# Patient Record
Sex: MALE | Race: WHITE | NOT HISPANIC OR LATINO | Employment: FULL TIME | URBAN - METROPOLITAN AREA
[De-identification: names, ages, dates, MRNs, and addresses within clinical notes are randomized per-mention and may not be internally consistent; named-entity substitution may affect disease eponyms.]

---

## 2022-04-04 ENCOUNTER — OFFICE VISIT (OUTPATIENT)
Dept: PHYSICAL THERAPY | Facility: CLINIC | Age: 37
End: 2022-04-04
Payer: COMMERCIAL

## 2022-04-04 DIAGNOSIS — M54.50 CHRONIC BILATERAL LOW BACK PAIN WITHOUT SCIATICA: Primary | ICD-10-CM

## 2022-04-04 DIAGNOSIS — G89.29 CHRONIC BILATERAL LOW BACK PAIN WITHOUT SCIATICA: Primary | ICD-10-CM

## 2022-04-04 DIAGNOSIS — M62.81 MUSCLE WEAKNESS (GENERALIZED): ICD-10-CM

## 2022-04-04 PROCEDURE — 97162 PT EVAL MOD COMPLEX 30 MIN: CPT | Performed by: PHYSICAL THERAPIST

## 2022-04-04 PROCEDURE — 97110 THERAPEUTIC EXERCISES: CPT | Performed by: PHYSICAL THERAPIST

## 2022-04-05 NOTE — PROGRESS NOTES
PT Evaluation     Today's date: 2022  Patient name: Ramandeep Kellogg  : 1985  MRN: [id number]  Referring provider: Gary Devries PT  Dx:   Encounter Diagnosis     ICD-10-CM    1  Chronic bilateral low back pain without sciatica  M54 50     G89 29    2  Muscle weakness (generalized)  M62 81                   Assessment  Assessment details: Ramandeep Kellogg is a 39 y o  male presenting to outpatient physical therapy at [address] [place] [address] with complaints of chronic progressive L sided LBP with insidious onset approximately three years ago   He presents with decreased range of motion, decreased strength, limited flexibility, poor postural awareness, poor body mechanics, poor balance, decreased tolerance to activity and decreased functional mobility due to Chronic bilateral low back pain without sciatica  (primary encounter diagnosis), Muscle weakness (generalized)  Therapist discussed diagnosis, prognosis, plan of care, proper responses to exercises, HEP, and modalities to use at home   Adenike Johnson would benefit from skilled PT services in order to address these deficits and reach maximum level of function   Thank you for the referral!  Impairments: abnormal coordination, abnormal gait, abnormal muscle firing, abnormal muscle tone, abnormal or restricted ROM, abnormal movement, activity intolerance, impaired physical strength, lacks appropriate home exercise program, pain with function, poor posture  and poor body mechanics    Symptom irritability: moderateUnderstanding of Dx/Px/POC: good   Prognosis: good    Goals  STGs (4 weeks):  1  Pt will report having at least a 50% improvement since I E    2  Pt will report having at most a 2/10 pain level with functional mobility  3  Pt will demonstrate good posture with prolonged positions as well as transitional movements especially from sitting to standing  LTGs (in 12 weeks):  1   Pt will report having at least a 75% improvement since I E    2  Pt will demonstrate good lifting mechanics for ADLs and activities  3  Pt will report having minimal AM pain when he gets OOB  4  Pt will be independent with HEP  5  Pt will deny having pain when he bends forward such as donning/doffing socks and shoes  Plan  Patient would benefit from: skilled physical therapy  Planned modality interventions: TENS and unattended electrical stimulation  Planned therapy interventions: joint mobilization, manual therapy, neuromuscular re-education, balance, patient education, self care, strengthening, stretching, therapeutic activities, therapeutic exercise, home exercise program, gait training and flexibility  Frequency: 2x week  Plan of Care beginning date: 2022  Plan of Care expiration date: 2022  Treatment plan discussed with: patient        Subjective Evaluation    History of Present Illness  Mechanism of injury: Pt is a 39year old male who presents with chronic progressive L sided LBP with insidious onset approximately three years ago  He reports that he has noted shifting to the right side when sitting and painful transitions however it used to work itself out  Recently he had an exacerbation with sxs with sxs not improving  No current imaging  Quality of life: good    Pain  Current pain ratin  At best pain ratin  At worst pain ratin  Pain location: L sided low back; L SI region  Quality: tight, sharp, dull ache, discomfort, pressure and pulling  Relieving factors: relaxation and rest  Aggravating factors: standing and walking (prolonged sitting with increased sxs with transition to standing; lifting; bending over; donning/doffing socks and shoes)  Progression: worsening    Patient Goals  Patient goals for therapy: decreased pain, increased motion, increased strength, independence with ADLs/IADLs, return to sport/leisure activities and return to work          Objective     Posture: Lumbar lordosis is increased in standing  There is right lateral shift      In siting, lumbar roll improves comfort      Lumbar AROM limitations:  (*=  Pain)  Lumbar flexion: 50%*  Lumbar extension: 75%  R side glide:  50%*  L side glide:  25%*    Mechanical Asessment: pre-test symtpoms include L sided low back pain near SI jt region  Repeated Extension in Standing (MARIANO): Slight improvement  Repeated Extension in Lying (REIL):  Slight improvement    Strength:  Core strength: Upper abs: 4-/5; lower abs: 3-/5     Right  Left  Hip flexion:  4/5  4/5  Knee ext  4/5  4/5  Ankle DF  4+/5  4+/5  Ankle PF  4/5  4/5  Knee flex  4-/5  4-/5      Hip abduction  4-/5  4-/5  Hip adduction  3+/5  3+/5  Hip extension  3+/5  3+/5    Joint mobility: decreased PA glides in lumbar spine    Tenderness/Palpation: no TPR    Sensation: intact light touch throughout BLEs     Flexibility: decreased HS; hip flexor; quadriceps, and piriformis (L worse than R)    Function: decreased trunk flexion with squatting movements with chair mobility        Precautions: standard    HEP: L lateral side gliding in standing, PERFECTO; PPU    Specialty Daily Treatment Diary       Manual 4/4       L sided positional distraction        L reverse [place] jt mob        PA glides lumbar spine        Tiger tail lumbar paraspinals                OP with PPU        Guarded L lateral side glide with pushing hips to the right                        Exercise Diary                 TERT with lumbar extension        Prone glute sets        Prone quad stretch c SOS                        PERFECTO-PPU 5 sec x 10        OP c PPU        Prone H' Ext                 Standing Doorway L lateral trunk gliding (R arm on doorway with hips dropping to the right) 10x       Standing Lumbar extension combo with L backward lean                        TAC        TAC c Marching on table        TAC c marching LEs off table        TAC c OH reach with BLE lifted off table                TAC c SLR                TAC c Dying Bug                TAC c LTR -> Lift BLEs off table Ball Squeezes        TB Supine Clamshells        TB Sidelying Clamshells        TB Bridges                        Seated or Supine HS stretch        Seated or Spine 2 way piriformis stretch                 Squats        Lunges                HEP/EDU Diagnosis, prognosis, plan of care; HEP; proper responses to exercises; modalities to use at home       Modalities        MH        CP        EStim           Skin checks performed pre and post application: intact

## 2022-04-07 ENCOUNTER — OFFICE VISIT (OUTPATIENT)
Dept: PHYSICAL THERAPY | Facility: CLINIC | Age: 37
End: 2022-04-07
Payer: COMMERCIAL

## 2022-04-07 DIAGNOSIS — M54.50 CHRONIC BILATERAL LOW BACK PAIN WITHOUT SCIATICA: Primary | ICD-10-CM

## 2022-04-07 DIAGNOSIS — M62.81 MUSCLE WEAKNESS (GENERALIZED): ICD-10-CM

## 2022-04-07 DIAGNOSIS — G89.29 CHRONIC BILATERAL LOW BACK PAIN WITHOUT SCIATICA: Primary | ICD-10-CM

## 2022-04-07 PROCEDURE — 97110 THERAPEUTIC EXERCISES: CPT | Performed by: PHYSICAL THERAPIST

## 2022-04-07 PROCEDURE — 97140 MANUAL THERAPY 1/> REGIONS: CPT | Performed by: PHYSICAL THERAPIST

## 2022-04-07 PROCEDURE — 97112 NEUROMUSCULAR REEDUCATION: CPT | Performed by: PHYSICAL THERAPIST

## 2022-04-07 NOTE — PROGRESS NOTES
Daily Note     Today's date: 2022  Patient name: Carine Infante  : 1985  MRN: [id number]  Referring provider: Joan Alvares, PT  Dx:   Encounter Diagnosis     ICD-10-CM    1  Chronic bilateral low back pain without sciatica  M54 50     G89 29    2  Muscle weakness (generalized)  M62 81                   Subjective: Pt reports that his wife has noticed improvements in his posture  He did notice having some lateral upper calf below the knee on the left side  Objective: See treatment diary below      Assessment: Tolerated treatment well; initiated POC with MH to low back in prone for prolonged lumbar extension f/b glute sets  VCs provided on proper sequencing with exercises; added ball squeezes, bilateral hip abduction in hooklying  He denies having increased pain throughout session  Discussed lifting techniques at home especially during yardwork  Discussed self TPR using tiger tail and rock ball  Patient demonstrated fatigue post treatment and would benefit from continued PT      Plan: Continue per plan of care            HEP: L lateral side gliding in standing, PERFECTO; PPU    Specialty Daily Treatment Diary       Manual       L sided positional distraction        L reverse [place] jt mob        PA glides lumbar spine  SMF      [place] tail lumbar paraspinals  SMF to L lateral calf              OP with PPU  2x10      Guarded L lateral side glide with pushing hips to the right  NV                      Exercise Diary                 TERT with lumbar extension  5 mins      Prone glute sets  5 sec x 30      Prone quad stretch c SOS  20 sec x 3 ea                      PERFECTO-PPU 5 sec x 10  5 sec x 10       OP c PPU  5 sec; 2x10      Prone H' Ext                 Standing Doorway L lateral trunk gliding (R arm on doorway with hips dropping to the right) 10x 2x10      Standing Lumbar extension combo with L backward lean                        TAC  10 sec x 10       TAC c Marching on table        TAC c marching LEs off table        TAC c OH reach with BLE lifted off table                TAC c SLR                TAC c Dying Bug                TAC c LTR -> Lift BLEs off table                Ball Squeezes  10 sec x 20      TB Hooklying Clamshells  BTB 10 sec x 20      TB Sidelying Clamshells        TB Bridges                        Seated or Supine HS stretch        Seated or Spine 2 way piriformis stretch                 Squats        Lunges                HEP/EDU Diagnosis, prognosis, plan of care; HEP; proper responses to exercises; modalities to use at home       Modalities        MH  10 mins in prone with lumbar extension      CP        EStim           Skin checks performed pre and post application: intact

## 2022-04-11 ENCOUNTER — OFFICE VISIT (OUTPATIENT)
Dept: PHYSICAL THERAPY | Facility: CLINIC | Age: 37
End: 2022-04-11
Payer: COMMERCIAL

## 2022-04-11 DIAGNOSIS — M62.81 MUSCLE WEAKNESS (GENERALIZED): ICD-10-CM

## 2022-04-11 DIAGNOSIS — G89.29 CHRONIC BILATERAL LOW BACK PAIN WITHOUT SCIATICA: Primary | ICD-10-CM

## 2022-04-11 DIAGNOSIS — M54.50 CHRONIC BILATERAL LOW BACK PAIN WITHOUT SCIATICA: Primary | ICD-10-CM

## 2022-04-11 PROCEDURE — 97112 NEUROMUSCULAR REEDUCATION: CPT | Performed by: PHYSICAL THERAPIST

## 2022-04-11 PROCEDURE — 97140 MANUAL THERAPY 1/> REGIONS: CPT | Performed by: PHYSICAL THERAPIST

## 2022-04-11 PROCEDURE — 97110 THERAPEUTIC EXERCISES: CPT | Performed by: PHYSICAL THERAPIST

## 2022-04-11 NOTE — PROGRESS NOTES
Daily Note     Today's date: 2022  Patient name: Brain Hughes  : 1985  MRN: [id number]  Referring provider: Oren Ching, PT  Dx:   Encounter Diagnosis     ICD-10-CM    1  Chronic bilateral low back pain without sciatica  M54 50     G89 29    2  Muscle weakness (generalized)  M62 81                   Subjective: Pt reports that he did feel better on Friday after the exercises however yesterday he did a lot of yard work and presents with soreness in his low back pain  Objective: See treatment diary below      Assessment: Tolerated treatment well; initiated POC with MH to low back with table elevated with glute sets f/b prone quad stretch  MT performed after receiving consent from pt; VCs provided on proper sequencing with exercises; added cross leg rotation stretch and advanced core strengthening  He had lateral lower leg pain during core advancement and thus held core exercise  Patient demonstrated fatigue post treatment and would benefit from continued PT       Plan: Continue per plan of care            HEP: L lateral side gliding in standing, PERFECTO; PPU    Specialty Daily Treatment Diary       Manual      L sided positional distraction        L reverse [place] jt mob        PA glides lumbar spine  SMF SMF     [place] tail lumbar paraspinals  SMF to L lateral calf              TPR B lumbar paraspinals    SMF             OP with PPU  2x10 2x10     Guarded L lateral side glide with pushing hips to the right  NV 10x                     Exercise Diary                 TERT with lumbar extension  5 mins 5 mins     Prone glute sets  5 sec x 30 5 sec x 30     Prone quad stretch c SOS  20 sec x 3 ea 20 sec x 5 ea                     PERFECTO-PPU 5 sec x 10  5 sec x 10  5 sec x 10     OP c PPU  5 sec; 2x10 5 sec; 2x10     Prone H' Ext                 Standing Doorway L lateral trunk gliding (R arm on doorway with hips dropping to the right) 10x 2x10 2x10     Standing Lumbar extension combo with L backward lean                        TAC  10 sec x 10       TAC c Marching on table   3 (p!  In lateral calf)     TAC c marching LEs off table        TAC c OH reach with BLE lifted off table                TAC c SLR                TAC c Dying Bug                TAC c LTR -> Lift BLEs off table                        Cross Leg Rotation stretch   10 sec x 5 ea     Ball Squeezes  10 sec x 20 10 sec x 20      TB Hooklying Clamshells  BTB 10 sec x 20 BTB 10 sec x 20     TB Sidelying Clamshells        TB Bridges                Supine Sciatic N Glide   NV             Seated or Supine HS stretch        Seated or Spine 2 way piriformis stretch                 Squats        Lunges                HEP/EDU Diagnosis, prognosis, plan of care; HEP; proper responses to exercises; modalities to use at home       Modalities        MH  10 mins in prone with lumbar extension 10 mins in prone with lumbar extension     CP        EStim           Skin checks performed pre and post application: intact

## 2022-04-14 ENCOUNTER — OFFICE VISIT (OUTPATIENT)
Dept: PHYSICAL THERAPY | Facility: CLINIC | Age: 37
End: 2022-04-14
Payer: COMMERCIAL

## 2022-04-14 DIAGNOSIS — M54.50 CHRONIC BILATERAL LOW BACK PAIN WITHOUT SCIATICA: Primary | ICD-10-CM

## 2022-04-14 DIAGNOSIS — M62.81 MUSCLE WEAKNESS (GENERALIZED): ICD-10-CM

## 2022-04-14 DIAGNOSIS — G89.29 CHRONIC BILATERAL LOW BACK PAIN WITHOUT SCIATICA: Primary | ICD-10-CM

## 2022-04-14 PROCEDURE — 97112 NEUROMUSCULAR REEDUCATION: CPT | Performed by: PHYSICAL THERAPIST

## 2022-04-14 PROCEDURE — 97140 MANUAL THERAPY 1/> REGIONS: CPT | Performed by: PHYSICAL THERAPIST

## 2022-04-14 PROCEDURE — 97110 THERAPEUTIC EXERCISES: CPT | Performed by: PHYSICAL THERAPIST

## 2022-04-14 NOTE — PROGRESS NOTES
Daily Note     Today's date: 2022  Patient name: Scott Benton  : 1985  MRN: [id number]  Referring provider: Kristyn Daniel PT  Dx:   Encounter Diagnosis     ICD-10-CM    1  Chronic bilateral low back pain without sciatica  M54 50     G89 29    2  Muscle weakness (generalized)  M62 81                   Subjective: Pt reports that with prolonged sitting he has increased pain in his low back as well as difficulty with transfers  Objective: See treatment diary below      Assessment: Tolerated treatment well; initiated POC with MH to low back in lumbar extension with glute sets  VCs provided on proper sequencing with exercises;  sciatic nerve glide  MT performed after receiving consent from pt; PA glides performed along lumbar spine and OP with PPU  Added lumbar extension to lateral glide  He denies having pain but some discomfort with SI jt region  He was instructed to perform over the weekend  Patient demonstrated fatigue post treatment and would benefit from continued PT      Plan: Continue per plan of care            HEP: L lateral side gliding in standing, PERFECTO; PPU    Specialty Daily Treatment Diary       Manual     L sided positional distraction        L reverse [place] jt mob        PA glides lumbar spine  SMF SMF SMF    [place] tail lumbar paraspinals  SMF to L lateral calf              TPR B lumbar paraspinals    SMF SMF            OP with PPU  2x10 2x10 *see below for OP    Guarded L lateral side glide with pushing hips to the right  NV 10x                     Exercise Diary                 TERT with lumbar extension  5 mins 5 mins 5 mins    Prone glute sets  5 sec x 30 5 sec x 30 5 sec x 30     Prone quad stretch c SOS  20 sec x 3 ea 20 sec x 5 ea 30 sec x 3 ea                    PERFECTO-PPU 5 sec x 10  5 sec x 10  5 sec x 10 5 sec x 10    OP c PPU  5 sec; 2x10 5 sec; 2x10 5 sec; 3x10     Prone H' Ext                 Standing Doorway L lateral trunk gliding (R arm on doorway with hips dropping to the right) 10x 2x10 2x10 10x    Standing Lumbar extension combo with L backward lean    10x                    TAC  10 sec x 10       TAC c Marching on table   3 (p!  In lateral calf)     TAC c marching LEs off table        TAC c OH reach with BLE lifted off table                TAC c SLR                TAC c Dying Bug                TAC c LTR -> Lift BLEs off table                        Cross Leg Rotation stretch   10 sec x 5 ea 15 sec x 3 ea    Ball Squeezes  10 sec x 20 10 sec x 20  10 sec x 20     TB Hooklying Clamshells  BTB 10 sec x 20 BTB 10 sec x 20 BTB 10 sec x 20    TB Sidelying Clamshells        TB Bridges                Supine Sciatic N Glide   NV 2 sec x 10 (LLE only)            Seated or Supine HS stretch    NV    Seated or Spine 2 way piriformis stretch     NV            Squats        Lunges                HEP/EDU Diagnosis, prognosis, plan of care; HEP; proper responses to exercises; modalities to use at home       Modalities        MH  10 mins in prone with lumbar extension 10 mins in prone with lumbar extension 10 mins in prone with lumbar extension    CP        EStim           Skin checks performed pre and post application: intact

## 2022-04-18 ENCOUNTER — OFFICE VISIT (OUTPATIENT)
Dept: PHYSICAL THERAPY | Facility: CLINIC | Age: 37
End: 2022-04-18
Payer: COMMERCIAL

## 2022-04-18 DIAGNOSIS — M62.81 MUSCLE WEAKNESS (GENERALIZED): ICD-10-CM

## 2022-04-18 DIAGNOSIS — M54.50 CHRONIC BILATERAL LOW BACK PAIN WITHOUT SCIATICA: Primary | ICD-10-CM

## 2022-04-18 DIAGNOSIS — G89.29 CHRONIC BILATERAL LOW BACK PAIN WITHOUT SCIATICA: Primary | ICD-10-CM

## 2022-04-18 PROCEDURE — 97110 THERAPEUTIC EXERCISES: CPT | Performed by: PHYSICAL THERAPIST

## 2022-04-18 PROCEDURE — 97112 NEUROMUSCULAR REEDUCATION: CPT | Performed by: PHYSICAL THERAPIST

## 2022-04-18 PROCEDURE — 97140 MANUAL THERAPY 1/> REGIONS: CPT | Performed by: PHYSICAL THERAPIST

## 2022-04-18 NOTE — PROGRESS NOTES
Daily Note     Today's date: 2022  Patient name: Aisha Peralta  : 1985  MRN: [id number]  Referring provider: Velvet Dailey, PT  Dx:   Encounter Diagnosis     ICD-10-CM    1  Chronic bilateral low back pain without sciatica  M54 50     G89 29    2  Muscle weakness (generalized)  M62 81                   Subjective: Patient reports that he was pretty stiff after last time  He notices more difficulty with sitting then having to perform standing transfers  Objective: See treatment diary below      Assessment: Tolerated treatment well; initiated POC with MH to low back with lumbar extension while performing there ex  VCs provided on proper sequencing with exercises; he reports having increased in L lateral calf pain with referred back in to the buttocks as well  Added DKTC, supine HS stretch  Discussed stretching prior to transitions with seated forward trunk flexion stretch  Patient demonstrated fatigue post treatment and would benefit from continued PT      Plan: Continue per plan of care        HEP: L lateral side gliding in standing, PERFECTO; PPU    Specialty Daily Treatment Diary       Manual    L sided positional distraction        L reverse [place] jt mob        PA glides lumbar spine  SMF SMF SMF SMF   [place] tail lumbar paraspinals  SMF to L lateral calf      Tiger tail L calf and hamstring     SMF   TPR B lumbar paraspinals    SMF SMF            OP with PPU  2x10 2x10 *see below for OP * see below for OP   Guarded L lateral side glide with pushing hips to the right  NV 10x                     Exercise Diary                 TERT with lumbar extension  5 mins 5 mins 5 mins 5 mins   Prone glute sets  5 sec x 30 5 sec x 30 5 sec x 30  5 sec x 30   Prone quad stretch c SOS  20 sec x 3 ea 20 sec x 5 ea 30 sec x 3 ea 30 sec x 3 ea                   PERFECTO-PPU 5 sec x 10  5 sec x 10  5 sec x 10 5 sec x 10 5 sec x 10   OP c PPU  5 sec; 2x10 5 sec; 2x10 5 sec; 3x10  5 sec; 2x10 Prone H' Ext                 Standing Doorway L lateral trunk gliding (R arm on doorway with hips dropping to the right) 10x 2x10 2x10 10x    Standing Lumbar extension combo with L backward lean    10x 10x                   TAC  10 sec x 10       TAC c Marching on table   3 (p!  In lateral calf)     TAC c marching LEs off table        TAC c OH reach with BLE lifted off table                TAC c SLR                TAC c Dying Bug                TAC c LTR -> Lift BLEs off table                        Cross Leg Rotation stretch   10 sec x 5 ea 15 sec x 3 ea 20 sec x 3 ea   Ball Squeezes  10 sec x 20 10 sec x 20  10 sec x 20  10 sec x 20   TB Hooklying Clamshells  BTB 10 sec x 20 BTB 10 sec x 20 BTB 10 sec x 20 BTB 10 sec x 20   TB Sidelying Clamshells        TB Bridges                DKTC     5 sec x 10           Supine Sciatic N Glide   NV 2 sec x 10 (LLE only) 2sec x 10 (LLE only)           Seated or Supine HS stretch    NV Supine c SOS: 20 sec x 3   Seated or Spine 2 way piriformis stretch     NV NV   Seated Forward Trunk Flexion in      5 sec x 5 ea forward and to the right           Squats        Lunges                HEP/EDU Diagnosis, prognosis, plan of care; HEP; proper responses to exercises; modalities to use at home       Modalities        MH  10 mins in prone with lumbar extension 10 mins in prone with lumbar extension 10 mins in prone with lumbar extension 10 mins in prone with lumbar extension   CP        EStim           Skin checks performed pre and post application: intact

## 2022-04-21 ENCOUNTER — OFFICE VISIT (OUTPATIENT)
Dept: PHYSICAL THERAPY | Facility: CLINIC | Age: 37
End: 2022-04-21
Payer: COMMERCIAL

## 2022-04-21 DIAGNOSIS — M54.50 CHRONIC BILATERAL LOW BACK PAIN WITHOUT SCIATICA: Primary | ICD-10-CM

## 2022-04-21 DIAGNOSIS — M62.81 MUSCLE WEAKNESS (GENERALIZED): ICD-10-CM

## 2022-04-21 DIAGNOSIS — G89.29 CHRONIC BILATERAL LOW BACK PAIN WITHOUT SCIATICA: Primary | ICD-10-CM

## 2022-04-21 PROCEDURE — 97112 NEUROMUSCULAR REEDUCATION: CPT | Performed by: PHYSICAL THERAPIST

## 2022-04-21 PROCEDURE — 97110 THERAPEUTIC EXERCISES: CPT | Performed by: PHYSICAL THERAPIST

## 2022-04-21 PROCEDURE — 97140 MANUAL THERAPY 1/> REGIONS: CPT | Performed by: PHYSICAL THERAPIST

## 2022-04-21 NOTE — PROGRESS NOTES
Daily Note     Today's date: 2022  Patient name: Vanessa Higgins  : 1985  MRN: [id number]  Referring provider: Cecilia Waldrop, PT  Dx:   Encounter Diagnosis     ICD-10-CM    1  Chronic bilateral low back pain without sciatica  M54 50     G89 29    2  Muscle weakness (generalized)  M62 81                   Subjective: Pt reports that while he was at work he had more back pain than he did while he was driving  Objective: See treatment diary below      Assessment: Tolerated treatment well; initiated POC with MH to low back while performing prone exercises in prolonged extension  VCs provided on proper sequencing with exercises; added forward trunk flexion stretch 3 ways  He has increased stiffness with 3 way ball roll outs thus performed lateral flexion to extension post   Patient demonstrated fatigue post treatment and would benefit from continued PT      Plan: Continue per plan of care        HEP: L lateral side gliding in standing, PERFECTO; PPU    Specialty Daily Treatment Diary       Manual    L sided positional distraction        L reverse [place] jt mob        PA glides lumbar spine SMF    SMF   [place] tail lumbar paraspinals        Tiger tail L calf and hamstring SMF    SMF   L LAD in prone SMF       TPR B lumbar paraspinals                 OP with PPU * see below for OP    * see below for OP   Guarded L lateral side glide with pushing hips to the right                        Exercise Diary                 TERT with lumbar extension 5 mins     5 mins   Prone glute sets 5 sec x 30    5 sec x 30   Prone quad stretch c SOS 30 sec x 3 ea    30 sec x 3 ea                   PERFECTO-PPU 5 sec x 10    5 sec x 10   OP c PPU 5 sec; 2x10    5 sec; 2x10   Prone H' Ext                 Standing Doorway L lateral trunk gliding (R arm on doorway with hips dropping to the right)        Standing Lumbar extension combo with L backward lean 10x    10x                   TAC        TAC c Marching on table TAC c marching LEs off table        TAC c OH reach with BLE lifted off table                TAC c SLR                TAC c Dying Bug                TAC c LTR -> Lift BLEs off table                        Cross Leg Rotation stretch 20 sec x 3 ea    20 sec x 3 ea   Ball Squeezes 10 sec x 30    10 sec x 20   TB Hooklying Clamshells BTB 10 sec x 30    BTB 10 sec x 20   TB Sidelying Clamshells        TB Bridges                DKTC 5 sec x 10     5 sec x 10           Supine Sciatic N Glide 2 sec x 10 ea    2sec x 10 (LLE only)           Seated or Supine HS stretch Supine c SOS: 20 sec x 3     Supine c SOS: 20 sec x 3   Seated or Supine 2 way piriformis stretch  NV    NV   Seated Forward Trunk Flexion using green PB 3 way 5 sec x 5 ea Only perform forward:    5 sec x 5 ea forward and to the right           Squats        Lunges                HEP/EDU        Modalities        MH 8 mins in prone with lumbar extension    10 mins in prone with lumbar extension   CP        EStim           Skin checks performed pre and post application: intact

## 2022-04-25 ENCOUNTER — OFFICE VISIT (OUTPATIENT)
Dept: PHYSICAL THERAPY | Facility: CLINIC | Age: 37
End: 2022-04-25
Payer: COMMERCIAL

## 2022-04-25 DIAGNOSIS — M62.81 MUSCLE WEAKNESS (GENERALIZED): ICD-10-CM

## 2022-04-25 DIAGNOSIS — M54.50 CHRONIC BILATERAL LOW BACK PAIN WITHOUT SCIATICA: Primary | ICD-10-CM

## 2022-04-25 DIAGNOSIS — G89.29 CHRONIC BILATERAL LOW BACK PAIN WITHOUT SCIATICA: Primary | ICD-10-CM

## 2022-04-25 PROCEDURE — 97140 MANUAL THERAPY 1/> REGIONS: CPT | Performed by: PHYSICAL THERAPIST

## 2022-04-25 PROCEDURE — 97110 THERAPEUTIC EXERCISES: CPT | Performed by: PHYSICAL THERAPIST

## 2022-04-25 PROCEDURE — 97112 NEUROMUSCULAR REEDUCATION: CPT | Performed by: PHYSICAL THERAPIST

## 2022-04-25 NOTE — PROGRESS NOTES
Daily Note     Today's date: 2022  Patient name: Melissa Carbajal  : 1985  MRN: [id number]  Referring provider: Shonda Christopher, PT  Dx:   Encounter Diagnosis     ICD-10-CM    1  Chronic bilateral low back pain without sciatica  M54 50     G89 29    2  Muscle weakness (generalized)  M62 81                   Subjective: Pt reports that when he was sweeping he had discomfort in that lateral aspect of his lower leg  Objective: See treatment diary below      Assessment: Tolerated treatment well; initiated POC with MH in lumbar extension with glute sets f/b prone quad stretch  VCs provided on proper sequencing with exercises; added supine TFL stretch in supine with stretch out strap  MT performed after receiving consent from pt; tiger tail performing lateral left lower leg which patient reports having tenderness  He reports having some discomfort post lumbar extension to lean  Patient demonstrated fatigue post treatment and would benefit from continued PT      Plan: Continue per plan of care        HEP: L lateral side gliding in standing, PERFECTO; PPU    Specialty Daily Treatment Diary       Manual    L sided positional distraction        L reverse [place] jt mob        PA glides lumbar spine SMF SMF   SMF   [place] tail lumbar paraspinals        Tiger tail L calf and hamstring SMF SMF   SMF   L LAD in prone SMF SMF      TPR B lumbar paraspinals                 OP with PPU * see below for OP    * see below for OP   Guarded L lateral side glide with pushing hips to the right                        Exercise Diary                 TERT with lumbar extension 5 mins  5 mins   5 mins   Prone glute sets 5 sec x 30 5 sec x 30   5 sec x 30   Prone quad stretch c SOS 30 sec x 3 ea 30 sec x 3 ea   30 sec x 3 ea                   PERFECTO-PPU 5 sec x 10 5 sec x 10    5 sec x 10   OP c PPU 5 sec; 2x10 5 sec; 2x10   5 sec; 2x10   Prone H' Ext                 Standing Doorway L lateral trunk gliding (R arm on doorway with hips dropping to the right)        Standing Lumbar extension combo with L backward lean 10x 10x   10x                   TAC        TAC c Marching on table        TAC c marching LEs off table        TAC c OH reach with BLE lifted off table                TAC c SLR                TAC c Dying Bug                TAC c LTR -> Lift BLEs off table                        Cross Leg Rotation stretch 20 sec x 3 ea 20 sec x 3 ea   20 sec x 3 ea   Ball Squeezes 10 sec x 30 10 sec x 30   10 sec x 20   TB Hooklying Clamshells BTB 10 sec x 30 BTB 10 sec x 30   BTB 10 sec x 20   TB Sidelying Clamshells        TB Bridges                DKTC 5 sec x 10  10 sec x 5    5 sec x 10           Supine Sciatic N Glide 2 sec x 10 ea 2 sec x 10 ea   2sec x 10 (LLE only)   Supine TFL stretch   10 sec x 3 ea              Seated or Supine HS stretch Supine c SOS: 20 sec x 3  Supine c SOS: 20 sec x 3 ea   Supine c SOS: 20 sec x 3   Seated or Supine 2 way piriformis stretch  NV    NV   Seated Forward Trunk Flexion using green PB 3 way 5 sec x 5 ea Only perform forward: 5 sec x 5    5 sec x 5 ea forward and to the right           Squats        Lunges                HEP/EDU        Modalities        MH 8 mins in prone with lumbar extension 8 mins in prone with lumbar extension   10 mins in prone with lumbar extension   CP        EStim           Skin checks performed pre and post application: intact

## 2022-04-28 ENCOUNTER — APPOINTMENT (OUTPATIENT)
Dept: PHYSICAL THERAPY | Facility: CLINIC | Age: 37
End: 2022-04-28
Payer: COMMERCIAL

## 2022-04-29 ENCOUNTER — OFFICE VISIT (OUTPATIENT)
Dept: PHYSICAL THERAPY | Facility: CLINIC | Age: 37
End: 2022-04-29
Payer: COMMERCIAL

## 2022-04-29 DIAGNOSIS — M62.81 MUSCLE WEAKNESS (GENERALIZED): ICD-10-CM

## 2022-04-29 DIAGNOSIS — M54.50 CHRONIC BILATERAL LOW BACK PAIN WITHOUT SCIATICA: Primary | ICD-10-CM

## 2022-04-29 DIAGNOSIS — G89.29 CHRONIC BILATERAL LOW BACK PAIN WITHOUT SCIATICA: Primary | ICD-10-CM

## 2022-04-29 PROCEDURE — 97140 MANUAL THERAPY 1/> REGIONS: CPT | Performed by: PHYSICAL THERAPIST

## 2022-04-29 PROCEDURE — 97112 NEUROMUSCULAR REEDUCATION: CPT | Performed by: PHYSICAL THERAPIST

## 2022-04-29 PROCEDURE — 97110 THERAPEUTIC EXERCISES: CPT | Performed by: PHYSICAL THERAPIST

## 2022-04-29 NOTE — PROGRESS NOTES
Daily Note     Today's date: 2022  Patient name: Nilda Ramirez  : 1985  MRN: [id number]  Referring provider: Svetlana Iraheta, PT  Dx:   Encounter Diagnosis     ICD-10-CM    1  Chronic bilateral low back pain without sciatica  M54 50     G89 29    2  Muscle weakness (generalized)  M62 81                   Subjective: Pt reports that he continues to have discomfort when he performs sit to stand transfers after prolonged sitting  Objective: See treatment diary below      Assessment: Tolerated treatment well; initiated POC with MH to low back pain  VCs provided on proper sequencing with exercises; added prone hip extension and bridges  He reports feeling okay post session however had some discomfort with his L SI region  Patient demonstrated fatigue post treatment and would benefit from continued PT      Plan: Continue per plan of care        HEP: L lateral side gliding in standing, PERFECTO; PPU    Specialty Daily Treatment Diary       Manual    L sided positional distraction        L reverse [place] jt mob        PA glides lumbar spine SMF SMF SMF  SMF   [place] tail lumbar paraspinals        Tiger tail L calf and hamstring SMF SMF SMF  SMF   L LAD in prone SMF SMF SMF ea     TPR B lumbar paraspinals                 OP with PPU * see below for OP    * see below for OP   Guarded L lateral side glide with pushing hips to the right                        Exercise Diary                 TERT with lumbar extension 5 mins  5 mins 5 mins  5 mins   Prone glute sets 5 sec x 30 5 sec x 30 5 sec x 30  5 sec x 30   Prone quad stretch c SOS 30 sec x 3 ea 30 sec x 3 ea 30 sec x 3 ea  30 sec x 3 ea                   PERFECTO-PPU 5 sec x 10 5 sec x 10  5 sec x 10   5 sec x 10   OP c PPU 5 sec; 2x10 5 sec; 2x10 5 sec; 2x10  5 sec; 2x10   Prone H' Ext    0# 10 ea             Standing Doorway L lateral trunk gliding (R arm on doorway with hips dropping to the right)        Standing Lumbar extension combo with L backward lean 10x 10x 10x  10x                   TAC        TAC c Marching on table        TAC c marching LEs off table        TAC c OH reach with BLE lifted off table                TAC c SLR                TAC c Dying Bug                TAC c LTR -> Lift BLEs off table                        Cross Leg Rotation stretch 20 sec x 3 ea 20 sec x 3 ea 30 sec x 3 ea  20 sec x 3 ea   Ball Squeezes 10 sec x 30 10 sec x 30 10 sec x 20  10 sec x 20   Ball c bridge   10x     TB Hooklying Clamshells BTB 10 sec x 30 BTB 10 sec x 30 MTB 10 sec x 20  BTB 10 sec x 20   TB Sidelying Clamshells        TB Bridges                DKTC 5 sec x 10  10 sec x 5  10 sec x 3  5 sec x 10           Supine Sciatic N Glide 2 sec x 10 ea 2 sec x 10 ea 2 sec x 10 ea  2sec x 10 (LLE only)   Supine TFL stretch   10 sec x 3 ea NV             Seated or Supine HS stretch Supine c SOS: 20 sec x 3  Supine c SOS: 20 sec x 3 ea Supine c SOS: 30 sec x 3 ea  Supine c SOS: 20 sec x 3   Seated or Supine 2 way piriformis stretch  NV    NV   Seated Forward Trunk Flexion using green PB 3 way 5 sec x 5 ea Only perform forward: 5 sec x 5    5 sec x 5 ea forward and to the right           Squats        Lunges                HEP/EDU        Modalities        MH 8 mins in prone with lumbar extension 8 mins in prone with lumbar extension 8 mins in prone with lumbar extension  10 mins in prone with lumbar extension   CP        EStim           Skin checks performed pre and post application: intact

## 2022-05-02 ENCOUNTER — OFFICE VISIT (OUTPATIENT)
Dept: PHYSICAL THERAPY | Facility: CLINIC | Age: 37
End: 2022-05-02
Payer: COMMERCIAL

## 2022-05-02 DIAGNOSIS — G89.29 CHRONIC BILATERAL LOW BACK PAIN WITHOUT SCIATICA: Primary | ICD-10-CM

## 2022-05-02 DIAGNOSIS — M54.50 CHRONIC BILATERAL LOW BACK PAIN WITHOUT SCIATICA: Primary | ICD-10-CM

## 2022-05-02 DIAGNOSIS — M62.81 MUSCLE WEAKNESS (GENERALIZED): ICD-10-CM

## 2022-05-02 PROCEDURE — 97112 NEUROMUSCULAR REEDUCATION: CPT | Performed by: PHYSICAL THERAPIST

## 2022-05-02 PROCEDURE — 97110 THERAPEUTIC EXERCISES: CPT | Performed by: PHYSICAL THERAPIST

## 2022-05-02 PROCEDURE — 97140 MANUAL THERAPY 1/> REGIONS: CPT | Performed by: PHYSICAL THERAPIST

## 2022-05-02 NOTE — PROGRESS NOTES
Daily Note     Today's date: 2022  Patient name: Nilda Ramirez  : 1985  MRN: [id number]  Referring provider: Svetlana Iraheta, PT  Dx:   Encounter Diagnosis     ICD-10-CM    1  Chronic bilateral low back pain without sciatica  M54 50     G89 29    2  Muscle weakness (generalized)  M62 81                   Subjective: Patient reports that he was sore after last visit but not as bad as prior visits  Objective: See treatment diary below      Assessment: Tolerated treatment well; initiated POC with MH to low back while performing glute sets with lumbar extension  VCs provided on proper sequencing with exercises; added piriformis stretch  Patient demonstrated fatigue post treatment and would benefit from continued PT      Plan: Continue per plan of care        HEP: L lateral side gliding in standing, PERFECTO; PPU    Specialty Daily Treatment Diary       Manual  5    L sided positional distraction        L reverse [place] jt mob        PA glides lumbar spine SMF SMF SMF SMF    [place] tail lumbar paraspinals        Tiger tail L calf and hamstring SMF SMF SMF SMF    L LAD in prone SMF SMF SMF ea SMF (L only with Grade IV-V jt mobs)    TPR B lumbar paraspinals                 OP with PPU * see below for OP       Guarded L lateral side glide with pushing hips to the right                        Exercise Diary                 TERT with lumbar extension 5 mins  5 mins 5 mins 5 mins    Prone glute sets 5 sec x 30 5 sec x 30 5 sec x 30 5 sec x 30    Prone quad stretch c SOS 30 sec x 3 ea 30 sec x 3 ea 30 sec x 3 ea 30 sec x 3 ea                    PERFECTO-PPU 5 sec x 10 5 sec x 10  5 sec x 10  5 sec x 10     OP c PPU 5 sec; 2x10 5 sec; 2x10 5 sec; 2x10 5 sec; 2x10    Prone H' Ext    0# 10 ea 0# 10 ea            Standing Doorway L lateral trunk gliding (R arm on doorway with hips dropping to the right)        Standing Lumbar extension combo with L backward lean 10x 10x 10x 10x                    TAC TAC c Marching on table        TAC c marching LEs off table        TAC c OH reach with BLE lifted off table                TAC c SLR                TAC c Dying Bug                TAC c LTR -> Lift BLEs off table                        Cross Leg Rotation stretch 20 sec x 3 ea 20 sec x 3 ea 30 sec x 3 ea     Ball Squeezes 10 sec x 30 10 sec x 30 10 sec x 20 10 sec x 20     Ball c bridge   10x     TB Hooklying Clamshells BTB 10 sec x 30 BTB 10 sec x 30 MTB 10 sec x 20 MTB 10 sec x 20    TB Sidelying Clamshells        TB Bridges                DKTC 5 sec x 10  10 sec x 5  10 sec x 3             Supine Sciatic N Glide 2 sec x 10 ea 2 sec x 10 ea 2 sec x 10 ea 2 sec x 10 ea    Supine TFL stretch   10 sec x 3 ea NV 10 sec x 3 ea            Seated or Supine HS stretch Supine c SOS: 20 sec x 3  Supine c SOS: 20 sec x 3 ea Supine c SOS: 30 sec x 3 ea Supine c SOS: 30 sec x 3 ea    Seated or Supine 2 way piriformis stretch  NV   Seated fig 4 stretch: 20 sec x 3 ea side    Seated Forward Trunk Flexion using green PB 3 way 5 sec x 5 ea Only perform forward: 5 sec x 5               Squats        Lunges                HEP/EDU        Modalities        MH 8 mins in prone with lumbar extension 8 mins in prone with lumbar extension 8 mins in prone with lumbar extension 8 mins in prone with lumbar extension     CP        EStim           Skin checks performed pre and post application: intact

## 2022-05-05 ENCOUNTER — APPOINTMENT (OUTPATIENT)
Dept: PHYSICAL THERAPY | Facility: CLINIC | Age: 37
End: 2022-05-05
Payer: COMMERCIAL

## 2022-05-11 ENCOUNTER — OFFICE VISIT (OUTPATIENT)
Dept: FAMILY MEDICINE CLINIC | Facility: CLINIC | Age: 37
End: 2022-05-11
Payer: COMMERCIAL

## 2022-05-11 VITALS
SYSTOLIC BLOOD PRESSURE: 140 MMHG | TEMPERATURE: 98 F | BODY MASS INDEX: 29.92 KG/M2 | HEIGHT: 70 IN | DIASTOLIC BLOOD PRESSURE: 90 MMHG | OXYGEN SATURATION: 98 % | HEART RATE: 84 BPM | WEIGHT: 209 LBS

## 2022-05-11 DIAGNOSIS — D22.9 MULTIPLE ATYPICAL SKIN MOLES: ICD-10-CM

## 2022-05-11 DIAGNOSIS — G89.29 CHRONIC LEFT-SIDED LOW BACK PAIN WITH LEFT-SIDED SCIATICA: Primary | ICD-10-CM

## 2022-05-11 DIAGNOSIS — R03.0 ELEVATED BLOOD PRESSURE READING: ICD-10-CM

## 2022-05-11 DIAGNOSIS — E66.3 OVERWEIGHT (BMI 25.0-29.9): ICD-10-CM

## 2022-05-11 DIAGNOSIS — M54.42 CHRONIC LEFT-SIDED LOW BACK PAIN WITH LEFT-SIDED SCIATICA: Primary | ICD-10-CM

## 2022-05-11 DIAGNOSIS — M25.552 LEFT HIP PAIN: ICD-10-CM

## 2022-05-11 PROCEDURE — 99203 OFFICE O/P NEW LOW 30 MIN: CPT | Performed by: PHYSICIAN ASSISTANT

## 2022-05-11 PROCEDURE — 1036F TOBACCO NON-USER: CPT | Performed by: PHYSICIAN ASSISTANT

## 2022-05-11 PROCEDURE — 3725F SCREEN DEPRESSION PERFORMED: CPT | Performed by: PHYSICIAN ASSISTANT

## 2022-05-11 NOTE — PROGRESS NOTES
Assessment/Plan:       Problem List Items Addressed This Visit    None     Visit Diagnoses     Chronic left-sided low back pain with left-sided sciatica    -  Primary    Relevant Orders    XR hip/pelv 2-3 vws left if performed    XR spine lumbar minimum 4 views non injury    CBC and differential    Comprehensive metabolic panel    Lipid panel    TSH, 3rd generation with Free T4 reflex    HIV 1/2 w/ Reflex (Multispot)    Hepatitis C antibody    Overweight (BMI 25 0-29  9)        Relevant Orders    CBC and differential    Comprehensive metabolic panel    Lipid panel    TSH, 3rd generation with Free T4 reflex    HIV 1/2 w/ Reflex (Multispot)    Hepatitis C antibody    Multiple atypical skin moles        Relevant Orders    Ambulatory Referral to Dermatology            Elevated blood pressure-  Monitor blood pressure, and return   To office in 4 weeks for follow up   Sent to get blood test and lumbar/hip xray    Subjective:      Patient ID: Jennifer Cardozo is a 39 y o  male  C/o low back pain past few years  No hx  Of injuries; no heavy lifting  Pain worse when sitting down  Getting physical therapy, with Ignacia Hamilton  Last blood test and physical 2009 with [facility]  Review of Systems   Constitutional: Negative for chills, diaphoresis, fatigue and fever  Eyes: Negative for pain, discharge and visual disturbance  Wears glasses, goes for routine eye exams  Respiratory: Negative for cough, chest tightness and shortness of breath  Gastrointestinal: Negative for abdominal pain, constipation, diarrhea, nausea and vomiting  Endocrine: Negative for polydipsia, polyphagia and polyuria  Genitourinary: Negative for dysuria, frequency, hematuria, penile discharge, penile pain, penile swelling, scrotal swelling and testicular pain  Musculoskeletal: Positive for back pain and myalgias  Negative for neck pain and neck stiffness  Lower left side of back, whenever seated     Radiates down left leg      Neurological: Negative for dizziness, tremors, weakness, light-headedness, numbness and headaches  Objective:      /90   Pulse 84   Temp 98 °F (36 7 °C)   Ht 5' 10" (1 778 m)   Wt 94 8 kg (209 lb)   SpO2 98%   BMI 29 99 kg/m²          Physical Exam  Vitals reviewed  Constitutional:       General: He is not in acute distress  Appearance: Normal appearance  He is not ill-appearing, toxic-appearing or diaphoretic  Cardiovascular:      Rate and Rhythm: Normal rate and regular rhythm  Pulses: Normal pulses  Heart sounds: Normal heart sounds  Pulmonary:      Effort: Pulmonary effort is normal  No respiratory distress  Breath sounds: Normal breath sounds  No stridor  No wheezing or rhonchi  Musculoskeletal:         General: No swelling, tenderness, deformity or signs of injury  Normal range of motion  Right lower leg: No edema  Left lower leg: No edema  Skin:     Comments: Moles noted on body, some atypical with shape and size  Neurological:      General: No focal deficit present  Mental Status: He is alert and oriented to person, place, and time  Cranial Nerves: No cranial nerve deficit  Sensory: No sensory deficit  Coordination: Coordination normal    Psychiatric:         Mood and Affect: Mood normal          Behavior: Behavior normal          Thought Content:  Thought content normal

## 2022-05-12 ENCOUNTER — OFFICE VISIT (OUTPATIENT)
Dept: PHYSICAL THERAPY | Facility: CLINIC | Age: 37
End: 2022-05-12
Payer: COMMERCIAL

## 2022-05-12 DIAGNOSIS — G89.29 CHRONIC BILATERAL LOW BACK PAIN WITHOUT SCIATICA: Primary | ICD-10-CM

## 2022-05-12 DIAGNOSIS — M54.50 CHRONIC BILATERAL LOW BACK PAIN WITHOUT SCIATICA: Primary | ICD-10-CM

## 2022-05-12 DIAGNOSIS — M62.81 MUSCLE WEAKNESS (GENERALIZED): ICD-10-CM

## 2022-05-12 PROCEDURE — 97112 NEUROMUSCULAR REEDUCATION: CPT | Performed by: PHYSICAL THERAPIST

## 2022-05-12 PROCEDURE — 97110 THERAPEUTIC EXERCISES: CPT | Performed by: PHYSICAL THERAPIST

## 2022-05-12 NOTE — PROGRESS NOTES
Daily Note     Today's date: 2022  Patient name: Jennifer Cardozo  : 1985  MRN: [id number]  Referring provider: Juan Alberto Austin, PT  Dx:   Encounter Diagnosis     ICD-10-CM    1  Chronic bilateral low back pain without sciatica  M54 50     G89 29    2  Muscle weakness (generalized)  M62 81                   Subjective: Pt reports that he still has low back pain especially when he sits for a while  Objective: See treatment diary below      Assessment: Tolerated treatment well; initiated POC with MH to low back while performing there ex  Updated and reviewed HEP  Session focused on a flexion based program   Patient demonstrated fatigue post treatment and would benefit from continued PT      Plan: Continue per plan of care        HEP: L lateral side gliding in standing, PERFECTO; PPU    Specialty Daily Treatment Diary       Manual    L sided positional distraction        L reverse [place] jt mob        PA glides lumbar spine SMF SMF SMF SMF    [place] tail lumbar paraspinals        [place]iger tail L calf and hamstring SMF SMF SMF SMF    L LAD in prone SMF SMF SMF ea SMF (L only with Grade IV-V jt mobs) SMF in prone L only Grade IV-V jt mobs   TPR B lumbar paraspinals                 OP with PPU * see below for OP       Guarded L lateral side glide with pushing hips to the right                        Exercise Diary                 TERT with lumbar extension 5 mins  5 mins 5 mins 5 mins    Prone glute sets 5 sec x 30 5 sec x 30 5 sec x 30 5 sec x 30    Prone quad stretch c SOS 30 sec x 3 ea 30 sec x 3 ea 30 sec x 3 ea 30 sec x 3 ea                    PERFECTO-PPU 5 sec x 10 5 sec x 10  5 sec x 10  5 sec x 10     OP c PPU 5 sec; 2x10 5 sec; 2x10 5 sec; 2x10 5 sec; 2x10    Prone H' Ext    0# 10 ea 0# 10 ea            Standing Doorway L lateral trunk gliding (R arm on doorway with hips dropping to the right)        Standing Lumbar extension combo with L backward lean 10x 10x 10x 10x TAC        TAC c Marching on table        TAC c marching LEs off table        TAC c OH reach with BLE lifted off table                TAC c SLR                TAC c Dying Bug                TAC c LTR -> Lift BLEs off table                        Cross Leg Rotation stretch 20 sec x 3 ea 20 sec x 3 ea 30 sec x 3 ea  20 sec x 3 ea   Ball Squeezes 10 sec x 30 10 sec x 30 10 sec x 20 10 sec x 20  10 sec x 30   Ball c bridge   10x     TB Hooklying Clamshells BTB 10 sec x 30 BTB 10 sec x 30 MTB 10 sec x 20 MTB 10 sec x 20 MTB 10 sec x 30   TB Sidelying Clamshells        TB Bridges                DKTC 5 sec x 10  10 sec x 5  10 sec x 3  5 sec x 10           Supine Sciatic N Glide 2 sec x 10 ea 2 sec x 10 ea 2 sec x 10 ea 2 sec x 10 ea 2 sec x 10 ea   Supine TFL stretch   10 sec x 3 ea NV 10 sec x 3 ea NV           Hand [place]     2 sec x 10           Seated or Supine HS stretch Supine c SOS: 20 sec x 3  Supine c SOS: 20 sec x 3 ea Supine c SOS: 30 sec x 3 ea Supine c SOS: 30 sec x 3 ea Supine c SOS: 30 sec x 3 ea   Seated or Supine 2 way piriformis stretch  NV   Seated fig 4 stretch: 20 sec x 3 ea side Supine: 30 sec x 3 ea   Seated Forward Trunk Flexion using green PB 3 way 5 sec x 5 ea Only perform forward: 5 sec x 5    3 way: 2 sec x 5 ea           Squats        Lunges                HEP/EDU        Modalities        MH 8 mins in prone with lumbar extension 8 mins in prone with lumbar extension 8 mins in prone with lumbar extension 8 mins in prone with lumbar extension  20 mins in hooklying with there ex   CP        EStim           Skin checks performed pre and post application: intact

## 2022-05-14 ENCOUNTER — APPOINTMENT (OUTPATIENT)
Dept: RADIOLOGY | Facility: CLINIC | Age: 37
End: 2022-05-14
Payer: COMMERCIAL

## 2022-05-14 DIAGNOSIS — G89.29 CHRONIC LEFT-SIDED LOW BACK PAIN WITH LEFT-SIDED SCIATICA: ICD-10-CM

## 2022-05-14 DIAGNOSIS — M54.42 CHRONIC LEFT-SIDED LOW BACK PAIN WITH LEFT-SIDED SCIATICA: ICD-10-CM

## 2022-05-14 DIAGNOSIS — M25.552 LEFT HIP PAIN: ICD-10-CM

## 2022-05-14 PROCEDURE — 73502 X-RAY EXAM HIP UNI 2-3 VIEWS: CPT

## 2022-05-14 PROCEDURE — 72110 X-RAY EXAM L-2 SPINE 4/>VWS: CPT

## 2022-05-16 ENCOUNTER — OFFICE VISIT (OUTPATIENT)
Dept: PHYSICAL THERAPY | Facility: CLINIC | Age: 37
End: 2022-05-16
Payer: COMMERCIAL

## 2022-05-16 DIAGNOSIS — M54.50 CHRONIC BILATERAL LOW BACK PAIN WITHOUT SCIATICA: Primary | ICD-10-CM

## 2022-05-16 DIAGNOSIS — M62.81 MUSCLE WEAKNESS (GENERALIZED): ICD-10-CM

## 2022-05-16 DIAGNOSIS — G89.29 CHRONIC BILATERAL LOW BACK PAIN WITHOUT SCIATICA: Primary | ICD-10-CM

## 2022-05-16 PROCEDURE — 97110 THERAPEUTIC EXERCISES: CPT | Performed by: PHYSICAL THERAPIST

## 2022-05-16 PROCEDURE — 97112 NEUROMUSCULAR REEDUCATION: CPT | Performed by: PHYSICAL THERAPIST

## 2022-05-16 NOTE — PROGRESS NOTES
PT Evaluation     Today's date: 2022  Patient name: Nilda Ramirez  : 1985  MRN: [id number]  Referring provider: Svetlana Iraheta PT  Dx:   Encounter Diagnosis     ICD-10-CM    1  Chronic bilateral low back pain without sciatica  M54 50     G89 29    2  Muscle weakness (generalized)  M62 81                   Assessment  Assessment details: Nilda Ramirez is a 39 y o  male presenting to outpatient physical therapy at [facility] with complaints of chronic progressive L sided LBP with insidious onset approximately three years ago   He presents with decreased range of motion, decreased strength, limited flexibility, poor postural awareness, poor body mechanics, poor balance, decreased tolerance to activity and decreased functional mobility due to Chronic bilateral low back pain without sciatica  (primary encounter diagnosis), Muscle weakness (generalized)  Therapist discussed diagnosis, prognosis, plan of care, proper responses to exercises, HEP, and modalities to use at home   Jenn Ramirezs would benefit from skilled PT services in order to address these deficits and reach maximum level of function   Thank you for the referral!  Impairments: abnormal coordination, abnormal gait, abnormal muscle firing, abnormal muscle tone, abnormal or restricted ROM, abnormal movement, activity intolerance, impaired physical strength, lacks appropriate home exercise program, pain with function, poor posture  and poor body mechanics    Symptom irritability: moderateUnderstanding of Dx/Px/POC: good   Prognosis: good    Goals  STGs (4 weeks):  1  Pt will report having at least a 50% improvement since I E  - Progressing towards  2  Pt will report having at most a 2/10 pain level with functional mobility  - Progressing towards  3  Pt will demonstrate good posture with prolonged positions as well as transitional movements especially from sitting to standing  - Progressing towards    LTGs (in 12 weeks):  1   Pt will report having at least a 75% improvement since I E  - Progressing towards  2  Pt will demonstrate good lifting mechanics for ADLs and activities  - Progressing towards   3  Pt will report having minimal AM pain when he gets OOB  - Progressing towards  4  Pt will be independent with HEP  - Progressing towards  5  Pt will deny having pain when he bends forward such as donning/doffing socks and shoes  - Progressing towards    Plan  Patient would benefit from: skilled physical therapy  Planned modality interventions: TENS and unattended electrical stimulation  Planned therapy interventions: joint mobilization, manual therapy, neuromuscular re-education, balance, patient education, self care, strengthening, stretching, therapeutic activities, therapeutic exercise, home exercise program, gait training and flexibility  Frequency: 2x week  Plan of Care beginning date: 2022  Plan of Care expiration date: 2022  Treatment plan discussed with: patient        Subjective Evaluation    History of Present Illness    RE 2022: Pt reports that he continues to have lateral leg pain when he squeezes in his stomach and activates his glutes  Mechanism of injury: Pt is a 39year old male who presents with chronic progressive L sided LBP with insidious onset approximately three years ago  He reports that he has noted shifting to the right side when sitting and painful transitions however it used to work itself out  Recently he had an exacerbation with sxs with sxs not improving  No current imaging  Quality of life: good    Pain  Current pain ratin  At best pain ratin  At worst pain ratin  Pain location: L sided low back; L SI region    Quality: tight, sharp, dull ache, discomfort, pressure and pulling  Relieving factors: relaxation and rest  Aggravating factors: standing and walking (prolonged sitting with increased sxs with transition to standing; lifting; bending over; donning/doffing socks and shoes)  Progression: gradually improving    Patient Goals  Patient goals for therapy: decreased pain, increased motion, increased strength, independence with ADLs/IADLs, return to sport/leisure activities and return to work          Objective     Posture: Lumbar lordosis is increased in standing  There is right lateral shift  In siting, lumbar roll improves comfort      Lumbar AROM limitations:  (*=  Pain)  Lumbar flexion: 50%*  Lumbar extension: 75%  R side glide:  50%*  L side glide:  25%*    Mechanical Asessment: pre-test symtpoms include L sided low back pain near SI jt region  Repeated Extension in Standing (MARIANO): Slight improvement  Repeated Extension in Lying (REIL):  Slight improvement    Strength:  Core strength: Upper abs: 4-/5; lower abs: 3-/5     Right  Left  Hip flexion:  4/5  4/5  Knee ext  4/5  4/5  Ankle DF  4+/5  4+/5  Ankle PF  4/5  4/5  Knee flex  4-/5  4-/5      Hip abduction  4-/5  4-/5  Hip adduction  3+/5  3+/5  Hip extension  3+/5  3+/5    Joint mobility: decreased PA glides in lumbar spine    Tenderness/Palpation: no TPR    Sensation: intact light touch throughout BLEs     Flexibility: decreased HS; hip flexor; quadriceps, and piriformis (L worse than R)    Function: decreased trunk flexion with squatting movements with chair mobility        Precautions: standard    HEP: L lateral side gliding in standing, PERFECTO; PPU    Specialty Daily Treatment Diary       Manual 5/16 5/2 5/12   L sided positional distraction        L reverse [place] jt mob        PA glides lumbar spine SMF   SMF    [place] tail lumbar paraspinals        [place] tail L calf and hamstring    SMF    L LAD in prone SMF in prone L only Grade IV-V jt mobs   SMF (L only with Grade IV-V jt mobs) SMF in prone L only Grade IV-V jt mobs   TPR B lumbar paraspinals                 OP with PPU        Guarded L lateral side glide with pushing hips to the right                        Exercise Diary                 TERT with lumbar extension    5 mins    Prone glute sets 5 sec x 30    Prone quad stretch c SOS    30 sec x 3 ea                    PERFECTO-PPU    5 sec x 10     OP c PPU    5 sec; 2x10    Prone H' Ext     0# 10 ea            Standing Doorway L lateral trunk gliding (R arm on doorway with hips dropping to the right)        Standing Lumbar extension combo with L backward lean    10x                    TAC        TAC c Marching on table        TAC c marching LEs off table        TAC c OH reach with BLE lifted off table                TAC c SLR                TAC c Dying Bug                TAC c LTR -> Lift BLEs off table                        Cross Leg Rotation stretch NV    20 sec x 3 ea   Ball Squeezes 10 sec x 30   10 sec x 20  10 sec x 30   Ball c bridge        TB Hooklying Clamshells MTB 10 sec x 30     MTB 10 sec x 20 MTB 10 sec x 30   TB Sidelying Clamshells        TB Bridges MTB 5 sec x 10               DKTC 5 sec x 10  DC to HEP   5 sec x 10           Supine Sciatic [place] At home DC to HEP  2 sec x 10 ea 2 sec x 10 ea   Supine TFL stretch  15 sec x 3 ea   10 sec x 3 ea NV           Hand [place] 2 sec; 2x10    2 sec x 10           Seated or Supine HS stretch Supine c SOS: 30 sec x 3 ea   Supine c SOS: 30 sec x 3 ea Supine c SOS: 30 sec x 3 ea   Seated or Supine 2 way piriformis stretch  Seated: 30 sec x 3 (fig 4 only)   Seated fig 4 stretch: 20 sec x 3 ea side Supine: 30 sec x 3 ea   Seated Forward Trunk Flexion using green PB 3 way 3 way : 2 sec x 5 ea    3 way: 2 sec x 5 ea           Squats        Lunges                HEP/EDU        Modalities        MH 20 mins in hooklying with there ex   8 mins in prone with lumbar extension  20 mins in hooklying with there ex   CP        EStim           Skin checks performed pre and post application: intact

## 2022-05-17 ENCOUNTER — CONSULT (OUTPATIENT)
Dept: DERMATOLOGY | Facility: CLINIC | Age: 37
End: 2022-05-17
Payer: COMMERCIAL

## 2022-05-17 VITALS — BODY MASS INDEX: 30.66 KG/M2 | TEMPERATURE: 98.7 F | HEIGHT: 69 IN | WEIGHT: 207 LBS

## 2022-05-17 DIAGNOSIS — L23.7 ALLERGIC CONTACT DERMATITIS DUE TO PLANTS, EXCEPT FOOD: Primary | ICD-10-CM

## 2022-05-17 DIAGNOSIS — D48.5 NEOPLASM OF UNCERTAIN BEHAVIOR OF SKIN: ICD-10-CM

## 2022-05-17 DIAGNOSIS — D22.9 MULTIPLE ATYPICAL SKIN MOLES: ICD-10-CM

## 2022-05-17 PROCEDURE — 88305 TISSUE EXAM BY PATHOLOGIST: CPT | Performed by: STUDENT IN AN ORGANIZED HEALTH CARE EDUCATION/TRAINING PROGRAM

## 2022-05-17 PROCEDURE — 88341 IMHCHEM/IMCYTCHM EA ADD ANTB: CPT | Performed by: STUDENT IN AN ORGANIZED HEALTH CARE EDUCATION/TRAINING PROGRAM

## 2022-05-17 PROCEDURE — 3008F BODY MASS INDEX DOCD: CPT | Performed by: PHYSICIAN ASSISTANT

## 2022-05-17 PROCEDURE — 11102 TANGNTL BX SKIN SINGLE LES: CPT | Performed by: DERMATOLOGY

## 2022-05-17 PROCEDURE — 88342 IMHCHEM/IMCYTCHM 1ST ANTB: CPT | Performed by: STUDENT IN AN ORGANIZED HEALTH CARE EDUCATION/TRAINING PROGRAM

## 2022-05-17 PROCEDURE — 11103 TANGNTL BX SKIN EA SEP/ADDL: CPT | Performed by: DERMATOLOGY

## 2022-05-17 PROCEDURE — 99204 OFFICE O/P NEW MOD 45 MIN: CPT | Performed by: DERMATOLOGY

## 2022-05-17 RX ORDER — TRIAMCINOLONE ACETONIDE 1 MG/G
CREAM TOPICAL
Qty: 30 G | Refills: 3 | Status: SHIPPED | OUTPATIENT
Start: 2022-05-17

## 2022-05-17 NOTE — PATIENT INSTRUCTIONS
MELANOCYTIC NEVI ("Moles")      Assessment and Plan:  Based on a thorough discussion of this condition and the management approach to it (including a comprehensive discussion of the known risks, side effects and potential benefits of treatment), the patient (family) agrees to implement the following specific plan: Will monitor for changes     Melanocytic Nevi  Melanocytic nevi ("moles") are caused by collections of the color producing skin cells, or melanocytes, in 1 area in the skin  They can range in color from pink to dark brown and be either raised or flat  Some moles are present at birth (I e , "congenital nevi"), while others come up later in life (i e , "acquired nevi")  Srinivas Gentleman exposure also stimulates the body to make more moles, ie the more sun you get the more moles you'll grow  Clinically distinguishing a healthy mole from melanoma may be difficult  The "ABCDE's" of moles have been suggested as a means of helping to alert a person to a suspicious mole and the possible increased risk of melanoma  Asymmetry: Healthy moles tend to be symmetric, while melanomas are often asymmetric  Asymmetry means if you draw a line through the mole, the two halves do not match in color, size, shape, or surface texture Any mole that starts to demonstrate "asymmetry" should be examined promptly by a board certified dermatologist      Border: Healthy moles tend to have discrete, even borders  The border of a melanoma often blends into the normal skin and does not sharply delineate the mole from normal skin  Any mole that starts to demonstrate "uneven borders" should be examined promptly     Color: Healthy moles tend to be one color throughout  Melanomas tend to be made up of different colors ranging from dark black, blue, white, or red    Any mole that demonstrates a color change should be examined promptly    Diameter: Healthy moles tend to be smaller than 0 6 cm in size; an exception are "congenital nevi" that can be larger  Melanomas tend to grow and can often be greater than 0 6 cm (1/4 of an inch, or the size of a pencil eraser)  This is only a guideline, and many normal moles may be larger than 0 6 cm without being unhealthy  Any mole that starts to change in size (small to bigger or bigger to smaller) should be examined promptly    Evolving: Healthy moles tend to "stay the same "  Melanomas may often show signs of change or evolution such as a change in size, shape, color, or elevation  Any mole that starts to itch, bleed, crust, burn, hurt, or ulcerate or demonstrate a change or evolution should be examined promptly by a board certified dermatologist       What are atypical moles or dysplastic nevi? Dysplastic moles are moles that have some of the ABCDE  changes listed above but  are not cancerous  Sometimes a biopsy and microscopic examination are needed to determine the difference  They may indicate an increased risk of melanoma in that person, especially if there is a family history of melanoma  What is a Melanoma? The main concern when looking at a new or changing mole it to evaluate whether it may be a melanoma  The appearance of a "new mole" remains one of the most reliable methods for identifying a malignant melanoma  A melanoma is a type of skin cancer that can be deadly if it spreads throughout the body  The prognosis of a Melanoma depends on how deep it has penetrated in the skin  If caught early, they generally will not have had time to grow into the deeper layers of the skin and they cure rate is then very high  Once the melanoma grows deeper into the skin, the cure rate drops dramatically  Therefore, early detection and removal of a malignant melanoma results in a much better chance of complete cure       NEOPLASM OF UNCERTAIN BEHAVIOR OF SKIN      Assessment and Plan:  I have discussed with the patient that a sample of skin via a "skin biopsy would be potentially helpful to further make a specific diagnosis under the microscope  Based on a thorough discussion of this condition and the management approach to it (including a comprehensive discussion of the known risks, side effects and potential benefits of treatment), the patient (family) agrees to implement the following specific plan:    Procedure:  Skin Biopsy  After a thorough discussion of treatment options and risk/benefits/alternatives (including but not limited to local pain, scarring, dyspigmentation, blistering, possible superinfection, and inability to confirm a diagnosis via histopathology), verbal and written consent were obtained and portion of the rash was biopsied for tissue sample  See below for consent that was obtained from patient and subsequent Procedure Note  PROCEDURE TANGENTIAL (SHAVE) BIOPSY NOTE:    Performing Physician: Dr Reid    PROCEDURE TANGENTIAL (SHAVE) BIOPSY NOTE:    Performing Physician: Dr Reid    PROCEDURE TANGENTIAL (SHAVE) BIOPSY NOTE:    Performing Physician: Elda Serra    After obtaining informed consent  at which time there was a discussion about the purpose of biopsy  and low risks of infection and bleeding  The area was prepped and draped in the usual fashion  Anesthesia was obtained with 1% lidocaine with epinephrine  A shave biopsy to an appropriate sampling depth was obtained by Shave (Dermablade or 15 blade) The resulting wound was covered with surgical ointment and bandaged appropriately  The patient tolerated the procedure well without complications and was without signs of functional compromise  Specimen has been sent for review by Dermatopathology  Standard post-procedure care has been explained and has been included in written form within the patient's copy of Informed Consent      INFORMED CONSENT DISCUSSION AND POST-OPERATIVE INSTRUCTIONS FOR PATIENT    I   RATIONALE FOR PROCEDURE  I understand that a skin biopsy allows the Dermatologist to test a lesion or rash under the microscope to obtain a diagnosis  It usually involves numbing the area with numbing medication and removing a small piece of skin; sometimes the area will be closed with sutures  In this specific procedure, sutures are not usually needed  If any sutures are placed, then they are usually need to be removed in 2 weeks or less  I understand that my Dermatologist recommends that a skin "shave" biopsy be performed today  A local anesthetic, similar to the kind that a dentist uses when filling a cavity, will be injected with a very small needle into the skin area to be sampled  The injected skin and tissue underneath "will go to sleep and become numb so no pain should be felt afterwards  An instrument shaped like a tiny "razor blade" (shave biopsy instrument) will be used to cut a small piece of tissue and skin from the area so that a sample of tissue can be taken and examined more closely under the microscope  A slight amount of bleeding will occur, but it will be stopped with direct pressure and a pressure bandage and any other appropriate methods  I understands that a scar will form where the wound was created  Surgical ointment will be applied to help protect the wound  Sutures are not usually needed  II   RISKS AND POTENTIAL COMPLICATIONS   I understand the risks and potential complications of a skin biopsy include but are not limited to the following:  Bleeding  Infection  Pain  Scar/keloid  Skin discoloration  Incomplete Removal  Recurrence  Nerve Damage/Numbness/Loss of Function  Allergic Reaction to Anesthesia  Biopsies are diagnostic procedures and based on findings additional treatment or evaluation may be required  Loss or destruction of specimen resulting in no additional findings    My Dermatologist has explained to me the nature of the condition, the nature of the procedure, and the benefits to be reasonably expected compared with alternative approaches    My Dermatologist has discussed the likelihood of major risks or complications of this procedure including the specific risks listed above, such as bleeding, infection, and scarring/keloid  I understand that a scar is expected after this procedure  I understand that my physician cannot predict if the scar will form a "keloid," which extends beyond the borders of the wound that is created  A keloid is a thick, painful, and bumpy scar  A keloid can be difficult to treat, as it does not always respond well to therapy, which includes injecting cortisone directly into the keloid every few weeks  While this usually reduces the pain and size of the scar, it does not eliminate it  I understand that photographs may be taken before and after the procedure  These will be maintained as part of the medical providers confidential records and may not be made available to me  I further authorize the medical provider to use the photographs for teaching purposes or to illustrate scientific papers, books, or lectures if in his/her judgment, medical research, education, or science may benefit from its use  I have had an opportunity to fully inquire about the risks and benefits of this procedure and its alternatives  I have been given ample time and opportunity to ask questions and to seek a second opinion if I wished to do so  I acknowledge that there have specifically been no guarantees as to the cosmetic results from the procedure  I am aware that with any procedure there is always the possibility of an unexpected complication  III  POST-PROCEDURAL CARE (WHAT YOU WILL NEED TO DO "AFTER THE BIOPSY" TO OPTIMIZE HEALING)    Keep the area clean and dry  Try NOT to remove the bandage or get it wet for the first 24 hours  Gently clean the area and apply surgical ointment (such as Vaseline petrolatum ointment, which is available "over the counter" and not a prescription) to the biopsy site for up to 2 weeks straight    This acts to protect the wound from the outside world  Sutures are not usually placed in this procedure  If any sutures were placed, return for suture removal as instructed (generally 1 week for the face, 2 weeks for the body)  Take Acetaminophen (Tylenol) for discomfort, if no contraindications  Ibuprofen or aspirin could make bleeding worse  Call our office immediately for signs of infection: fever, chills, increased redness, warmth, tenderness, discomfort/pain, or pus or foul smell coming from the wound  WHAT TO DO IF THERE IS ANY BLEEDING? If a small amount of bleeding is noticed, place a clean cloth over the area and apply firm pressure for ten minutes  Check the wound after 10 minutes of direct pressure  If bleeding persists, try one more time for an additional 10 minutes of direct pressure on the area  If the bleeding becomes heavier or does not stop after the second attempt, or if you have any other questions about this procedure, then please call your [facility] D[facility]atologist by calling [phone] (SKIN)  I hereby acknowledge that I have reviewed and verified the site with my Dermatologist and have requested and authorized my Dermatologist to proceed with the procedure  CONTACT DERMATITIS        Assessment and Plan:  Diagnosis:  Contact dermatitis  Based on a thorough discussion of this condition and the management approach to it (including a comprehensive discussion of the known risks, side effects and potential benefits of treatment), the patient (family) agrees to implement the following specific plan:     * apply triamcinolone 0 1% cream twice daily for 2 to 3 weeks to active areas  Do not use on the face or groin areas  What is contact dermatitis? Contact dermatitis is a type of eczema that results from something coming in contact with the skin  There are 2 types:  irritant and allergic  The majority of cases are from irritation    Usually that is from contact with strong soaps, repeated exposure to water, contact with cleaning agents or food, or friction  The minority are an actual allergy  In these cases something is coming in contact with the skin and causing an allergic reaction, similar to what happens with poison ivy  This usually occurs unexpectedly after using something for many years  Even very tiny amounts of the substance on the skin can cause a reaction  Some common causes are fragrances, preservatives and metals  Sometimes this can occur when an allergic substance is eaten, as well, but most often it is from direct contact with the skin  To determine the cause of allergy a patch test is often done  Some general rules to follow for both types of contact dermatitis are:   Wear gloves when using strong cleansers or before prolonged contact with water (like washing dishes)   Use gentle cleansers and avoid strong soaps   Apply moisturizer to entire body after bathing    Avoid products with fragrance    Most often contact dermatitis is treated with topical medicines like topical steroids, eucrisa, pimecrolimus or tacrolimus     Some times oral steroids, ie prednisone, methylprednisone and prednisolone, are needed  In chronic cases, treatment options include:  light therapy, methotrexate, cyclosporin

## 2022-05-17 NOTE — PROGRESS NOTES
[facility] Note     Patient Name: José Pearl  Encounter Date: 5/17/22     Have you been cared for by a [facility] Dermatologist in the last 3 years and, if so, which one? No    · Have you traveled outside of the [facility] in the past 3 months? No     May we call your Preferred Phone number to discuss your specific medical information? Yes     May we leave a detailed message that includes your specific medical information? Yes      Today's Chief Concerns:   Concern #1:  Skin exam      Past Medical History:  Have you personally ever had or currently have any of the following? · Skin cancer (such as Melanoma, Basal Cell Carcinoma, Squamous Cell Carcinoma? (If Yes, please provide more detail)- No  · Eczema: No  · Psoriasis: No  · HIV/AIDS: No  · Hepatitis B or C: No  · Tuberculosis: No  · Systemic Immunosuppression such as Diabetes, Biologic or Immunotherapy, Chemotherapy, Organ Transplantation, Bone Marrow Transplantation (If YES, please provide more detail): No  · Radiation Treatment (If YES, please provide more detail): No  · Any other major medical conditions/concerns? (If Yes, which types)- No    Social History:    What is/was your primary occupation?     What are your hobbies/past-times? Yard work    Family history:    Have any of your "first degree relatives" (parent, brother, sister, or child) had any of the following       · Skin cancer such as Melanoma or Merkel Cell Carcinoma or Pancreatic Cancer? No  · Eczema, Asthma, Hay Fever or Seasonal Allergies: No  · Psoriasis or Psoriatic Arthritis: No  · Do any other medical conditions seem to run in your family? If Yes, what condition and which relatives?  hypertension    Current MedicationsNo current outpatient medications on file  Review of Systems/System Alerts:  Have you recently had or currently have any of the following? If YES, what are you doing for the problem?     · Fever, chills or unintended weight loss: No  · Sudden loss or change in your vision: No  · Nausea, vomiting or blood in your stool: No  · Painful or swollen joints: No  · Wheezing or cough: No  · Changing mole or non-healing wound: No  · Nosebleeds: No  · Excessive sweating: No  · Easy or prolonged bleeding? No  · Over the last 2 weeks, how often have you been bothered by the following problems? · Taking little interest or pleasure in doing things: 1 - Not at All  · Feeling down, depressed, or hopeless: 1 - Not at All  · Rapid heartbeat with epinephrine:  No  · Any known allergies? No  No Known Allergies      PHYSICAL EXAM:       Was a chaperone (Derm Clinical Assistant) present throughout the entire Physical Exam? Yes     Did the Dermatology Team specifically  the patient on the importance of a Full Skin Exam to be sure that nothing is missed clinically?  Yes}  o Did the patient request or accept a Full Skin Exam?  Yes  o Did the patient specifically refuse to have the areas "under-the-bra" examined by the Dermatologist? No  o Did the patient specifically refuse to have the areas "under-the-underwear" examined by the Dermatologist? No      CONSTITUTIONAL  Vitals:    05/17/22 0852   Temp: 98 7 °F (37 1 °C)   TempSrc: Temporal   Weight: 93 9 kg (207 lb)   Height: 5' 9" (1 753 m)         PSYCH: Normal mood and affect  EYES: Normal conjunctiva  ENT: Normal lips and oral mucosa  CARDIOVASCULAR: No edema  RESPIRATORY: Normal respirations  HEME/LYMPH/IMMUNO:  No regional lymphadenopathy except as noted below in ASSESSMENT AND PLAN BY DIAGNOSIS    SKIN:  FULL ORGAN SYSTEM EXAM  Hair, Scalp, Ears, Face Normal except as noted below in Assessment   Neck, Cervical Chain Nodes Normal except as noted below in Assessment   Right Arm/Hand/Fingers Normal except as noted below in Assessment   Left Arm/Hand/Fingers Normal except as noted below in Assessment   Chest/Breasts/Axillae Viewed areas Normal except as noted below in Assessment Abdomen, Umbilicus Normal except as noted below in Assessment   Back/Spine Normal except as noted below in Assessment   Groin/Genitalia/Buttocks Not examined   Right Leg, Foot, Toes Normal except as noted below in Assessment   Left Leg, Foot, Toes Normal except as noted below in Assessment          MELANOCYTIC NEVI ("Moles")    Physical Exam:   Anatomic Location Affected:   Mostly on sun-exposed areas of the body   Morphological Description:  Scattered, 1-4mm round to ovoid, symmetrical-appearing, even bordered, skin colored to dark brown macules/papules, mostly in sun-exposed areas  4 mm light brown thin evenly pigmented flat topped papule at site of concern   Pertinent Positives:   Pertinent Negatives: Additional History of Present Condition:  Patient states moles have been present for years  Had a lesion on the right chest that has recently fallen off and has left a pink lise  No history of skin cancer, but has never been seen by a dermatologist  Yg [facility] in [facility] for 4 years  Assessment and Plan:  Based on a thorough discussion of this condition and the management approach to it (including a comprehensive discussion of the known risks, side effects and potential benefits of treatment), the patient (family) agrees to implement the following specific plan:     Will monitor for changes; suspect traumatized benign nevus  Patient to RTC for any regrowth or change, but no features suggestive of malignancy at chest site of concern today  Numerous other benign nevi without outliers other than below   The patient was encouraged to use an SPF30+ broad spectrum sunscreen daily and re-apply every 2-3 outdoors while outside  The importance of sun protection, self-skin exams, and sun avoidance was emphasized  An annual full body skin exam is recommended, and the patient was encouraged to return to the office sooner for any new or changing lesions of concerns       Melanocytic Nevi  Melanocytic nevi ("moles") are caused by collections of the color producing skin cells, or melanocytes, in 1 area in the skin  They can range in color from pink to dark brown and be either raised or flat  Some moles are present at birth (I e , "congenital nevi"), while others come up later in life (i e , "acquired nevi")  Gilbert Nella exposure also stimulates the body to make more moles, ie the more sun you get the more moles you'll grow  Clinically distinguishing a healthy mole from melanoma may be difficult  The "ABCDE's" of moles have been suggested as a means of helping to alert a person to a suspicious mole and the possible increased risk of melanoma  Asymmetry: Healthy moles tend to be symmetric, while melanomas are often asymmetric  Asymmetry means if you draw a line through the mole, the two halves do not match in color, size, shape, or surface texture Any mole that starts to demonstrate "asymmetry" should be examined promptly by a board certified dermatologist      Border: Healthy moles tend to have discrete, even borders  The border of a melanoma often blends into the normal skin and does not sharply delineate the mole from normal skin  Any mole that starts to demonstrate "uneven borders" should be examined promptly     Color: Healthy moles tend to be one color throughout  Melanomas tend to be made up of different colors ranging from dark black, blue, white, or red  Any mole that demonstrates a color change should be examined promptly    Diameter: Healthy moles tend to be smaller than 0 6 cm in size; an exception are "congenital nevi" that can be larger  Melanomas tend to grow and can often be greater than 0 6 cm (1/4 of an inch, or the size of a pencil eraser)  This is only a guideline, and many normal moles may be larger than 0 6 cm without being unhealthy    Any mole that starts to change in size (small to bigger or bigger to smaller) should be examined promptly    Evolving: Healthy moles tend to "stay the same " Melanomas may often show signs of change or evolution such as a change in size, shape, color, or elevation  Any mole that starts to itch, bleed, crust, burn, hurt, or ulcerate or demonstrate a change or evolution should be examined promptly by a board certified dermatologist       What are atypical moles or dysplastic nevi? Dysplastic moles are moles that have some of the ABCDE  changes listed above but  are not cancerous  Sometimes a biopsy and microscopic examination are needed to determine the difference  They may indicate an increased risk of melanoma in that person, especially if there is a family history of melanoma  What is a Melanoma? The main concern when looking at a new or changing mole it to evaluate whether it may be a melanoma  The appearance of a "new mole" remains one of the most reliable methods for identifying a malignant melanoma  A melanoma is a type of skin cancer that can be deadly if it spreads throughout the body  The prognosis of a Melanoma depends on how deep it has penetrated in the skin  If caught early, they generally will not have had time to grow into the deeper layers of the skin and they cure rate is then very high  Once the melanoma grows deeper into the skin, the cure rate drops dramatically  Therefore, early detection and removal of a malignant melanoma results in a much better chance of complete cure       NEOPLASM OF UNCERTAIN BEHAVIOR OF SKIN    Physical Exam:   (Anatomic Location); (Size and Morphological Description); (Differential Diagnosis):  o Specimen A; Right posterior shoulder; skin; shave biopsy; 3 mm irregular  Pigmented blue brown macule ddx benign nevus vs dysplastic nevus  Vs blue nevus, rule out melanoma  o Specimen B; Right posterior thigh; skin; shave biopsy;5 mm irregular pigmented papule ddx; benign nevus vs dysplastic nevus rule out melanoma  o Specimen C; Left lateral palm; skin; shave biopsy; 0 3 cm x 0 1 cm irregular bordered macule; ddx; benign acral nevus vs dysplastic nevus rule out melanoma  o    Pertinent Positives:   Pertinent Negatives: Additional History of Present Condition:      Assessment and Plan:   I have discussed with the patient that a sample of skin via a "skin biopsy would be potentially helpful to further make a specific diagnosis under the microscope   Based on a thorough discussion of this condition and the management approach to it (including a comprehensive discussion of the known risks, side effects and potential benefits of treatment), the patient (family) agrees to implement the following specific plan:    o Procedure:  Skin Biopsy  After a thorough discussion of treatment options and risk/benefits/alternatives (including but not limited to local pain, scarring, dyspigmentation, blistering, possible superinfection, and inability to confirm a diagnosis via histopathology), verbal and written consent were obtained and portion of the rash was biopsied for tissue sample  See below for consent that was obtained from patient and subsequent Procedure Note      PROCEDURE TANGENTIAL (SHAVE) BIOPSY NOTE:     Performing Physician: Jeanette Ibrahim Anatomic Location; Clinical Description with size (cm); Pre-Op Diagnosis: Specimen A; Right posterior shoulder; skin; shave biopsy; 3 mm irregular  pigmented macule ddx benign nevus vs dysplastic nevus rule out melanoma   Post-op diagnosis: Same      Local anesthesia: 1% Lidocaine HCL      Topical anesthesia: None     Hemostasis: Aluminum chloride     PROCEDURE TANGENTIAL (SHAVE) BIOPSY NOTE:     Performing Physician: Jeanette Ibrahim Anatomic Location; Clinical Description with size (cm); Pre-Op Diagnosis: Specimen B; Right posterior thigh; skin; shave biopsy;5 mm irregular pigmented papule ddx; benign nevus vs dysplastic nevus rule out melanoma   Post-op diagnosis: Same      Local anesthesia: 1% Lidocaine HCL      Topical anesthesia: None     Hemostasis: Aluminum chloride PROCEDURE TANGENTIAL (SHAVE) BIOPSY NOTE:     Performing Physician: Sofia Alegre Anatomic Location; Clinical Description with size (cm); Pre-Op Diagnosis: Specimen C; Left lateral palm; skin; shave biopsy; 0 3 cm x 0 1 cm irregular bordered macule; ddx; benign nevus vs dysplastic nevus rule out melanoma   Post-op diagnosis: Same      Local anesthesia: 1% Lidocaine HCL      Topical anesthesia: None     Hemostasis: Aluminum chloride       After obtaining informed consent  at which time there was a discussion about the purpose of biopsy  and low risks of infection and bleeding  The area was prepped and draped in the usual fashion  Anesthesia was obtained with 1% lidocaine with epinephrine  A shave biopsy to an appropriate sampling depth was obtained by Shave (Dermablade or 15 blade) The resulting wound was covered with surgical ointment and bandaged appropriately  The patient tolerated the procedure well without complications and was without signs of functional compromise  Specimen has been sent for review by Dermatopathology  Standard post-procedure care has been explained and has been included in written form within the patient's copy of Informed Consent  INFORMED CONSENT DISCUSSION AND POST-OPERATIVE INSTRUCTIONS FOR PATIENT    I   RATIONALE FOR PROCEDURE  I understand that a skin biopsy allows the Dermatologist to test a lesion or rash under the microscope to obtain a diagnosis  It usually involves numbing the area with numbing medication and removing a small piece of skin; sometimes the area will be closed with sutures  In this specific procedure, sutures are not usually needed  If any sutures are placed, then they are usually need to be removed in 2 weeks or less  I understand that my Dermatologist recommends that a skin "shave" biopsy be performed today    A local anesthetic, similar to the kind that a dentist uses when filling a cavity, will be injected with a very small needle into the skin area to be sampled  The injected skin and tissue underneath "will go to sleep and become numb so no pain should be felt afterwards  An instrument shaped like a tiny "razor blade" (shave biopsy instrument) will be used to cut a small piece of tissue and skin from the area so that a sample of tissue can be taken and examined more closely under the microscope  A slight amount of bleeding will occur, but it will be stopped with direct pressure and a pressure bandage and any other appropriate methods  I understands that a scar will form where the wound was created  Surgical ointment will be applied to help protect the wound  Sutures are not usually needed  II   RISKS AND POTENTIAL COMPLICATIONS   I understand the risks and potential complications of a skin biopsy include but are not limited to the following:   Bleeding   Infection   Pain   Scar/keloid   Skin discoloration   Incomplete Removal   Recurrence   Nerve Damage/Numbness/Loss of Function   Allergic Reaction to Anesthesia   Biopsies are diagnostic procedures and based on findings additional treatment or evaluation may be required   Loss or destruction of specimen resulting in no additional findings    My Dermatologist has explained to me the nature of the condition, the nature of the procedure, and the benefits to be reasonably expected compared with alternative approaches  My Dermatologist has discussed the likelihood of major risks or complications of this procedure including the specific risks listed above, such as bleeding, infection, and scarring/keloid  I understand that a scar is expected after this procedure  I understand that my physician cannot predict if the scar will form a "keloid," which extends beyond the borders of the wound that is created  A keloid is a thick, painful, and bumpy scar    A keloid can be difficult to treat, as it does not always respond well to therapy, which includes injecting cortisone directly into the keloid every few weeks  While this usually reduces the pain and size of the scar, it does not eliminate it  I understand that photographs may be taken before and after the procedure  These will be maintained as part of the medical providers confidential records and may not be made available to me  I further authorize the medical provider to use the photographs for teaching purposes or to illustrate scientific papers, books, or lectures if in his/her judgment, medical research, education, or science may benefit from its use  I have had an opportunity to fully inquire about the risks and benefits of this procedure and its alternatives  I have been given ample time and opportunity to ask questions and to seek a second opinion if I wished to do so  I acknowledge that there have specifically been no guarantees as to the cosmetic results from the procedure  I am aware that with any procedure there is always the possibility of an unexpected complication  III  POST-PROCEDURAL CARE (WHAT YOU WILL NEED TO DO "AFTER THE BIOPSY" TO OPTIMIZE HEALING)     Keep the area clean and dry  Try NOT to remove the bandage or get it wet for the first 24 hours   Gently clean the area and apply surgical ointment (such as Vaseline petrolatum ointment, which is available "over the counter" and not a prescription) to the biopsy site for up to 2 weeks straight  This acts to protect the wound from the outside world   Sutures are not usually placed in this procedure  If any sutures were placed, return for suture removal as instructed (generally 1 week for the face, 2 weeks for the body)   Take Acetaminophen (Tylenol) for discomfort, if no contraindications  Ibuprofen or aspirin could make bleeding worse   Call our office immediately for signs of infection: fever, chills, increased redness, warmth, tenderness, discomfort/pain, or pus or foul smell coming from the wound      WHAT TO DO IF THERE IS ANY BLEEDING? If a small amount of bleeding is noticed, place a clean cloth over the area and apply firm pressure for ten minutes  Check the wound after 10 minutes of direct pressure  If bleeding persists, try one more time for an additional 10 minutes of direct pressure on the area  If the bleeding becomes heavier or does not stop after the second attempt, or if you have any other questions about this procedure, then please call your [facility] Dermatologist by calling [phone] (SKIN)  I hereby acknowledge that I have reviewed and verified the site with my Dermatologist and have requested and authorized my Dermatologist to proceed with the procedure  CONTACT DERMATITIS    Physical Exam:   Anatomic Location Affected:  Left hand   Morphological Description:  Erythematous scaly papules, plaques   Pertinent Positives:   Pertinent Negatives: Additional History of Present Condition:  Patent states gets poison ivy intermittently throughout the summer  Request topical to use prn flares  Currently flaring slightly on L hand    Assessment and Plan:  Diagnosis:  Contact dermatitis  Based on a thorough discussion of this condition and the management approach to it (including a comprehensive discussion of the known risks, side effects and potential benefits of treatment), the patient (family) agrees to implement the following specific plan:     * apply triamcinolone 0 1% cream twice daily for 2 to 3 weeks to active areas  Do not use on the face or groin areas  What is contact dermatitis? Contact dermatitis is a type of eczema that results from something coming in contact with the skin  There are 2 types:  irritant and allergic  The majority of cases are from irritation  Usually that is from contact with strong soaps, repeated exposure to water, contact with cleaning agents or food, or friction  The minority are an actual allergy    In these cases something is coming in contact with the skin and causing an allergic reaction, similar to what happens with poison ivy  This usually occurs unexpectedly after using something for many years  Even very tiny amounts of the substance on the skin can cause a reaction  Some common causes are fragrances, preservatives and metals  Sometimes this can occur when an allergic substance is eaten, as well, but most often it is from direct contact with the skin  To determine the cause of allergy a patch test is often done  Some general rules to follow for both types of contact dermatitis are:   Wear gloves when using strong cleansers or before prolonged contact with water (like washing dishes)   Use gentle cleansers and avoid strong soaps   Apply moisturizer to entire body after bathing    Avoid products with fragrance    Most often contact dermatitis is treated with topical medicines like topical steroids, eucrisa, pimecrolimus or tacrolimus     Some times oral steroids, ie prednisone, methylprednisone and prednisolone, are needed  In chronic cases, treatment options include:  light therapy, methotrexate, cyclosporin      Scribe Attestation    I,:  Efrem Mcdowell MA am acting as a scribe while in the presence of the attending physician :       I,:  Ml Green MD personally performed the services described in this documentation    as scribed in my presence :

## 2022-05-19 ENCOUNTER — OFFICE VISIT (OUTPATIENT)
Dept: PHYSICAL THERAPY | Facility: CLINIC | Age: 37
End: 2022-05-19
Payer: COMMERCIAL

## 2022-05-19 ENCOUNTER — CLINICAL SUPPORT (OUTPATIENT)
Dept: FAMILY MEDICINE CLINIC | Facility: CLINIC | Age: 37
End: 2022-05-19
Payer: COMMERCIAL

## 2022-05-19 DIAGNOSIS — M62.81 MUSCLE WEAKNESS (GENERALIZED): ICD-10-CM

## 2022-05-19 DIAGNOSIS — G89.29 CHRONIC BILATERAL LOW BACK PAIN WITHOUT SCIATICA: Primary | ICD-10-CM

## 2022-05-19 DIAGNOSIS — Z13.29 SCREENING FOR THYROID DISORDER: ICD-10-CM

## 2022-05-19 DIAGNOSIS — Z13.220 SCREENING, LIPID: ICD-10-CM

## 2022-05-19 DIAGNOSIS — Z11.4 SCREENING FOR HIV (HUMAN IMMUNODEFICIENCY VIRUS): Primary | ICD-10-CM

## 2022-05-19 DIAGNOSIS — Z13.29 SCREENING FOR ENDOCRINE, NUTRITIONAL, METABOLIC AND IMMUNITY DISORDER: ICD-10-CM

## 2022-05-19 DIAGNOSIS — Z11.59 ENCOUNTER FOR HEPATITIS C VIRUS SCREENING TEST FOR HIGH RISK PATIENT: ICD-10-CM

## 2022-05-19 DIAGNOSIS — M25.552 LEFT HIP PAIN: ICD-10-CM

## 2022-05-19 DIAGNOSIS — G89.29 CHRONIC LEFT-SIDED LOW BACK PAIN WITH LEFT-SIDED SCIATICA: ICD-10-CM

## 2022-05-19 DIAGNOSIS — Z13.0 SCREENING FOR ENDOCRINE, NUTRITIONAL, METABOLIC AND IMMUNITY DISORDER: ICD-10-CM

## 2022-05-19 DIAGNOSIS — M54.50 CHRONIC BILATERAL LOW BACK PAIN WITHOUT SCIATICA: Primary | ICD-10-CM

## 2022-05-19 DIAGNOSIS — M54.42 CHRONIC LEFT-SIDED LOW BACK PAIN WITH LEFT-SIDED SCIATICA: ICD-10-CM

## 2022-05-19 DIAGNOSIS — Z91.89 ENCOUNTER FOR HEPATITIS C VIRUS SCREENING TEST FOR HIGH RISK PATIENT: ICD-10-CM

## 2022-05-19 DIAGNOSIS — E66.3 OVERWEIGHT (BMI 25.0-29.9): ICD-10-CM

## 2022-05-19 DIAGNOSIS — Z13.0 SCREENING FOR IRON DEFICIENCY ANEMIA: ICD-10-CM

## 2022-05-19 DIAGNOSIS — Z13.228 SCREENING FOR ENDOCRINE, NUTRITIONAL, METABOLIC AND IMMUNITY DISORDER: ICD-10-CM

## 2022-05-19 DIAGNOSIS — Z13.21 SCREENING FOR ENDOCRINE, NUTRITIONAL, METABOLIC AND IMMUNITY DISORDER: ICD-10-CM

## 2022-05-19 PROCEDURE — 97112 NEUROMUSCULAR REEDUCATION: CPT | Performed by: PHYSICAL THERAPIST

## 2022-05-19 PROCEDURE — 97140 MANUAL THERAPY 1/> REGIONS: CPT | Performed by: PHYSICAL THERAPIST

## 2022-05-19 PROCEDURE — 97110 THERAPEUTIC EXERCISES: CPT | Performed by: PHYSICAL THERAPIST

## 2022-05-19 PROCEDURE — 36415 COLL VENOUS BLD VENIPUNCTURE: CPT

## 2022-05-19 NOTE — PROGRESS NOTES
Daily Note     Today's date: 2022  Patient name: Marci Mcdonald  : 1985  MRN: [id number]  Referring provider: Jhonatan Martinez PA-C  Dx:   Encounter Diagnosis     ICD-10-CM    1  Chronic bilateral low back pain without sciatica  M54 50     G89 29    2  Muscle weakness (generalized)  M62 81                   Subjective: Pt reports that he was in the office the other day and having to sit for a while  He did notice a little improvement with getting up from sitting  Objective: See treatment diary below      Assessment: Tolerated treatment well; initiated POC with MH to low back while performing there ex  VCs provided on proper sequencing with exercises; added bridge with ball and TAC with OH reach feet off and on table  He reports having increased left lateral calf discomfort during TAC c feet off table and thus recommended to place feet on table which reduced pain  MT performed after receiving consent from pt; he reports having some tenderness with palpation of glutes and calf  Added slant board gastroc stretch  Patient demonstrated fatigue post treatment and would benefit from continued PT      Plan: Continue per plan of care          HEP: L lateral side gliding in standing, PERFECTO; PPU    Specialty Daily Treatment Diary       Manual    L sided positional distraction        L reverse [place] jt mob        PA glides lumbar spine SMF SMF  SMF    TPR L glute in sidelying  SMF      [place] tail lumbar paraspinals        [place]iger tail L calf and hamstring  SMF  SMF    TPR L calf in prone  SMF      L LAD in prone SMF in prone L only Grade IV-V jt mobs SMF in prone L only Grade IV-V jt mobs  SMF (L only with Grade IV-V jt mobs) SMF in prone L only Grade IV-V jt mobs   TPR B lumbar paraspinals                 OP with PPU        Guarded L lateral side glide with pushing hips to the right                        Exercise Diary                 TERT with lumbar extension    5 mins    Prone glute sets    5 sec x 30    Prone quad stretch c SOS    30 sec x 3 ea                    PERFECTO-PPU    5 sec x 10     OP c PPU    5 sec; 2x10    Prone H' Ext     0# 10 ea            Standing Doorway L lateral trunk gliding (R arm on doorway with hips dropping to the right)        Standing Lumbar extension combo with L backward lean    10x                    TAC        TAC c Marching on table        TAC c marching LEs off table        TAC c OH reach with BLE lifted off table  5# 10 (BLE lifted off table slight increased sxs); 5# 20 (BLE on table)              TAC c SLR                TAC c Dying Bug                TAC c LTR -> Lift BLEs off table                        Cross Leg Rotation stretch NV 20 sec x 3 ea   20 sec x 3 ea   Ball Squeezes 10 sec x 30 10 sec x 30  10 sec x 20  10 sec x 30   Ball c bridge  5 sec x 10       TB Hooklying Clamshells MTB 10 sec x 30   MTB 10 sec x 30  MTB 10 sec x 20 MTB 10 sec x 30   TB Sidelying Clamshells        TB Bridges MTB 5 sec x 10 MTB 5 sec x 10              DKTC 5 sec x 10  DC to HEP   5 sec x 10           Supine Sciatic N [place] At home DC to HEP  2 sec x 10 ea 2 sec x 10 ea   Supine TFL stretch  15 sec x 3 ea NV  10 sec x 3 ea NV           [place] 2 sec; 2x10 2 sec; 2x10   2 sec x 10           Seated or Supine HS stretch Supine c SOS: 30 sec x 3 ea Supine c SOS: 30 sec x 3 ea  Supine c SOS: 30 sec x 3 ea Supine c SOS: 30 sec x 3 ea   Seated or Supine 2 way piriformis stretch  Seated: 30 sec x 3 (fig 4 only) Seated: 30 sec x 3 ea (fig 4 only)  Seated fig 4 stretch: 20 sec x 3 ea side Supine: 30 sec x 3 ea   Seated Forward Trunk Flexion using green PB 3 way 3 way : 2 sec x 5 ea 3 way: 2 sec x 5 ea   3 way: 2 sec x 5 ea           Slant board gastroc-soleus stretch  20 sec x 3 ea      Squats        Lunges                HEP/EDU        Modalities        MH 20 mins in hooklying with there ex 20 mins in hooklying with there ex  8 mins in prone with lumbar extension  20 mins in hooklying with there ex   CP        EStim           Skin checks performed pre and post application: intact

## 2022-05-23 ENCOUNTER — APPOINTMENT (OUTPATIENT)
Dept: PHYSICAL THERAPY | Facility: CLINIC | Age: 37
End: 2022-05-23
Payer: COMMERCIAL

## 2022-05-24 LAB
ALBUMIN SERPL-MCNC: 4.6 G/DL (ref 4–5)
ALBUMIN/GLOB SERPL: 2 {RATIO} (ref 1.2–2.2)
ALP SERPL-CCNC: 36 IU/L (ref 44–121)
ALT SERPL-CCNC: 15 IU/L (ref 0–44)
AST SERPL-CCNC: 16 IU/L (ref 0–40)
BASOPHILS # BLD AUTO: 0 X10E3/UL (ref 0–0.2)
BASOPHILS NFR BLD AUTO: 1 %
BILIRUB SERPL-MCNC: 0.3 MG/DL (ref 0–1.2)
BUN SERPL-MCNC: 12 MG/DL (ref 6–20)
BUN/CREAT SERPL: 14 (ref 9–20)
CALCIUM SERPL-MCNC: 9.4 MG/DL (ref 8.7–10.2)
CHLORIDE SERPL-SCNC: 103 MMOL/L (ref 96–106)
CHOLEST SERPL-MCNC: 187 MG/DL (ref 100–199)
CO2 SERPL-SCNC: 22 MMOL/L (ref 20–29)
CREAT SERPL-MCNC: 0.84 MG/DL (ref 0.76–1.27)
EGFR: 116 ML/MIN/1.73
EOSINOPHIL # BLD AUTO: 0.1 X10E3/UL (ref 0–0.4)
EOSINOPHIL NFR BLD AUTO: 2 %
ERYTHROCYTE [DISTWIDTH] IN BLOOD BY AUTOMATED COUNT: 13.5 % (ref 11.6–15.4)
GLOBULIN SER-MCNC: 2.3 G/DL (ref 1.5–4.5)
GLUCOSE SERPL-MCNC: 93 MG/DL (ref 65–99)
HCT VFR BLD AUTO: 38.9 % (ref 37.5–51)
HCV RNA SERPL NAA+PROBE-ACNC: NORMAL IU/ML
HDLC SERPL-MCNC: 46 MG/DL
HGB BLD-MCNC: 12.7 G/DL (ref 13–17.7)
HGB FRACT BLD-IMP: NEGATIVE
HIV 2 RNA SERPL QL NAA+PROBE: NON REACTIVE
HIV IA ALGORITHM INTERP SERPLBLD-IMP: NORMAL
HIV1 AB SERPL QL IA: NON REACTIVE
HIV1 RNA SERPL QL NAA+PROBE: NON REACTIVE
IMM GRANULOCYTES # BLD: 0 X10E3/UL (ref 0–0.1)
IMM GRANULOCYTES NFR BLD: 0 %
LDLC SERPL CALC-MCNC: 128 MG/DL (ref 0–99)
LDLC/HDLC SERPL: 2.8 RATIO (ref 0–3.6)
LYMPHOCYTES # BLD AUTO: 1.7 X10E3/UL (ref 0.7–3.1)
LYMPHOCYTES NFR BLD AUTO: 27 %
MCH RBC QN AUTO: 28 PG (ref 26.6–33)
MCHC RBC AUTO-ENTMCNC: 32.6 G/DL (ref 31.5–35.7)
MCV RBC AUTO: 86 FL (ref 79–97)
MONOCYTES # BLD AUTO: 0.5 X10E3/UL (ref 0.1–0.9)
MONOCYTES NFR BLD AUTO: 8 %
NEUTROPHILS # BLD AUTO: 3.9 X10E3/UL (ref 1.4–7)
NEUTROPHILS NFR BLD AUTO: 62 %
PLATELET # BLD AUTO: 268 X10E3/UL (ref 150–450)
POTASSIUM SERPL-SCNC: 4.6 MMOL/L (ref 3.5–5.2)
PROT SERPL-MCNC: 6.9 G/DL (ref 6–8.5)
RBC # BLD AUTO: 4.53 X10E6/UL (ref 4.14–5.8)
SL AMB HIV 2 AB: NON REACTIVE
SL AMB VLDL CHOLESTEROL CALC: 13 MG/DL (ref 5–40)
SODIUM SERPL-SCNC: 141 MMOL/L (ref 134–144)
TEST INFORMATION: NORMAL
TRIGL SERPL-MCNC: 70 MG/DL (ref 0–149)
TSH SERPL DL<=0.005 MIU/L-ACNC: 1.85 UIU/ML (ref 0.45–4.5)
WBC # BLD AUTO: 6.2 X10E3/UL (ref 3.4–10.8)

## 2022-05-26 ENCOUNTER — OFFICE VISIT (OUTPATIENT)
Dept: PHYSICAL THERAPY | Facility: CLINIC | Age: 37
End: 2022-05-26
Payer: COMMERCIAL

## 2022-05-26 DIAGNOSIS — M62.81 MUSCLE WEAKNESS (GENERALIZED): ICD-10-CM

## 2022-05-26 DIAGNOSIS — G89.29 CHRONIC BILATERAL LOW BACK PAIN WITHOUT SCIATICA: Primary | ICD-10-CM

## 2022-05-26 DIAGNOSIS — M54.50 CHRONIC BILATERAL LOW BACK PAIN WITHOUT SCIATICA: Primary | ICD-10-CM

## 2022-05-26 PROCEDURE — 97140 MANUAL THERAPY 1/> REGIONS: CPT | Performed by: PHYSICAL THERAPIST

## 2022-05-26 PROCEDURE — 97110 THERAPEUTIC EXERCISES: CPT | Performed by: PHYSICAL THERAPIST

## 2022-05-26 PROCEDURE — 97112 NEUROMUSCULAR REEDUCATION: CPT | Performed by: PHYSICAL THERAPIST

## 2022-05-26 NOTE — RESULT ENCOUNTER NOTE
DERMATOPATHOLOGY RESULT NOTE    Results reviewed by ordering physician  Called patient to personally discuss results  Discussed results with patient  Instructions for Clinical Derm Team:     Call patient and schedule for recheck on mild to moderately atypical nevus on L palm in 3-4 weeks  30 minute slot please (may re-excise if still clinically apparent residual)    Result & Plan by Specimen:    Specimen A: benign  Plan: reassured, benign      Specimen B: benign  Plan: reassured, benign      Specimen C: indeterminate  Plan: Clinical follow up in 3-4 weeks to determine if residual atypical nevus on exam  Will discuss option for observation for progression vs re-excision at that time  Component   Case Report  Surgical Pathology Report                         Case: [id number]                                   Authorizing Provider: Gilbert Shukla MD     Collected:           05/17/2022 0949              Ordering Location:     [facility]      Received:            05/17/2022 0950                                   [address] [place] [address]                                                                Pathologist:           Marshall Angela MD                                                           Specimens:   A) - Skin, Other, Specimen A Right posterior shoulder                                               B) - Skin, Other, Specimen B Right posterior thigh                                                  C) - Skin, Other, Specimen C lateral palm                                                Final Diagnosis  A  Skin, right posterior shoulder, shave biopsy:     Consistent with LENTIGO with pigment incontinence (see note)      Note: SOX10, HMB45, and MART-1 immunostains were performed and reviewed  Multiple levels examined            B   Skin, right posterior thigh, shave biopsy:     Lentiginous compound nevus with focal mild cytologic atypia; extending to the tissue edges (see note)      Note: SOX10, HMB45, and MART-1 immunostains were performed and reviewed  Multiple levels examined            C  Skin, lateral palm, shave biopsy:     Acral compound nevus with mild to moderate atypia (architectural disorder); extending to the tissue edges (see note)      Note: Multifocal intraepidermal architectural disorder (pagetoid scatter) of melanocytes is noted, which is frequently seen in these lesions  SOX10, HMB45, and MART-1 immunostains were performed and reviewed  Multiple levels examined  If the lesion were to progress, additional sampling should be sought         Electronically signed by Benson Joshi MD on 5/25/2022 at  3:39 PM  Comments: This is an appended report  These results have been appended to a previously preliminary verified report  Preliminary Diagnosis           Preliminary result electronically signed by Benson Joshi MD on 5/24/2022 at  6:05 PM  Additional Information   All reported additional testing was performed with appropriately reactive controls   These tests were developed and their performance characteristics determined by [address] [place] [address] [facility] or appropriate performing facility, though some tests may be performed on tissues which have not been validated for performance characteristics (such as staining performed on alcohol exposed cell blocks and decalcified tissues)   Results should be interpreted with caution and in the context of the patients' clinical condition  These tests may not be cleared or approved by the [place]  Food and Drug Administration, though the FDA has determined that such clearance or approval is not necessary  These tests are used for clinical purposes and they should not be regarded as investigational or for research  This laboratory has been approved by CLIA 88, designated as a high-complexity laboratory and is qualified to perform these tests  Anthony Sheldon Description     A   The specimen is received in formalin, labeled with the patient's name and hospital number, and is designated "right posterior shoulder  "  It consists a 0 6 x 0 3 x 0 1 cm skin shave  The epidermis displays a 0 2 x 0 2 cm gray, well-defined macule which abuts the nearest peripheral skin margin  The deep margin is inked green and the skin surface is inked red  Entirely submitted  One cassette  Between sponges           B  The specimen is received in formalin, labeled with the patient's name and hospital number, and is designated "right posterior thigh  "  It consists of a 0 8 x 0 5 x 0 1 cm skin shave  The epidermis displays a 0 6 x 0 4 cm tan-brown, mottled lesion which abuts the nearest peripheral skin margin  The deep margin is inked green and the skin surface is inked red  The specimen is bisected and entirely submitted  One cassette           C  The specimen is received in formalin, labeled with the patient's name and hospital number, and is designated "lateral palm  "  It consists of three 0 5-0 8 cm tan-white to brown, irregular, focally friable soft tissue fragments  Entirely submitted  One cassette  Between sponges      Note: The estimated total formalin fixation time based upon information provided by the submitting clinician and the standard processing schedule is under 72 hours      [facility]       Clinical Information   ATTENTION: [facility]     SPECIMEN A; Skin; Anatomic Location: Right posterior shoulder; Procedure/Protocol: Skin Specimen (submit in FORMALIN):Tangential Biopsy (includes shave, scoop, saucerization, curette) (CPT 21024; each additional tangential biopsy is CPT 33169)   39y o  year old  Male with a Morphological Description:3 mm irregular pigmented blue brown macule   Differential Diagnosis and/or Specific Clinical Question:benign vs atypical nevus, blue nevus, r/o melanoma   Any Previous Pathology for Dermatopathologist to Review: [facility] Accession #: none     ATTENTION:  DERMPATH GROUP     SPECIMEN B; Skin;  Anatomic Location: Right posterior thigh; Procedure/Protocol: Skin Specimen (submit in FORMALIN):Tangential Biopsy (includes shave, scoop, saucerization, curette) (CPT 77548; each additional tangential biopsy is CPT 19582)   39y o  year old  Male with a Morphological Description:5 mm irregularly pigmented papule   Differential Diagnosis and/or Specific Clinical Question:benign nevus vs atypical nevus rule out melanoma   Any Previous Pathology for Dermatopathologist to Review: [facility] Accession #:none     ATTENTION: [facility]     SPECIMEN C; Skin;  Anatomic Location: Left lateral palm; Procedure/Protocol: Skin Specimen (submit in FORMALIN):Tangential Biopsy (includes shave, scoop, saucerization, curette) (CPT 69570; each additional tangential biopsy is CPT 97472)   39y o  year old  Male with a Morphological Description:0 3 x 0 1 cm irregulary pigmented macule with parallel furrow pattern but only present for a few years and possibly enlarging   Differential Diagnosis and/or Specific Clinical Question:benign acral nevus vs atypical nevus; rule out melanoma   Any Previous Pathology for Dermatopathologist to Review: [facility] Accession #: [id number]

## 2022-05-26 NOTE — PROGRESS NOTES
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 and had progressive low back pain  Then he sneezed and had intense increased pain concentrated to the low back on the left side  Objective: See treatment diary below      Assessment: Tolerated treatment well; initiated POC with MH to low back while performing glute sets and prone quad stretch in prolonged lumbar extension  VCs provided on proper sequencing with exercises; added lateral trunk flexion to the right and standing forward trunk flexion  MT performed after receiving consent from pt; session focused on left sided low back pain near SI jt region  Patient demonstrated fatigue post treatment and would benefit from continued PT      Plan: Continue per plan of care        HEP: L lateral side gliding in standing, PERFECTO; PPU    Specialty Daily Treatment Diary       Manual    L sided positional distraction   SMF     L reverse [place] jt mob        PA glides lumbar spine SMF SMF SMF     TPR L glute in sidelying  SMF SMF     [place]iger tail lumbar paraspinals        [place]iger tail L calf and hamstring  SMF      TPR L calf in prone  SMF      L LAD in prone SMF in prone L only Grade IV-V jt mobs SMF in prone L only Grade IV-V jt mobs SMF in prone and supine  SMF in prone L only Grade IV-V jt mobs   TPR B lumbar paraspinals                 OP with PPU   SMF 5 sec x 10     Guarded L lateral side glide with pushing hips to the right        Low back mobs with BLE press down and then rock to the side for L side   SMF             Exercise Diary                 TERT with lumbar extension   10 mins     Prone glute sets   5 sec x 30 Prone quad stretch c SOS   30 sec x 3 ea                     PERFECTO-PPU   5 sec x 10     OP c PPU   5 sec x 10      Prone H' Ext                 Standing Doorway L lateral trunk gliding (R arm on doorway with hips dropping to the right)   10 ea side     Standing Lumbar extension combo with L backward lean        Standing forward trunk flexion off to the right   2 sec x 5              TAC        TAC c Marching on table        TAC c marching LEs off table        TAC c OH reach with BLE lifted off table  5# 10 (BLE lifted off table slight increased sxs); 5# 20 (BLE on table)              TAC c SLR                TAC c Dying Bug                TAC c LTR -> Lift BLEs off table                        Cross Leg Rotation stretch NV 20 sec x 3 ea 30 sec x 3 ea  20 sec x 3 ea   Ball Squeezes 10 sec x 30 10 sec x 30 10 sec x 20  10 sec x 30   Ball c bridge  5 sec x 10       TB Hooklying Clamshells MTB 10 sec x 30   MTB 10 sec x 30 MTB 10 sec x 20  MTB 10 sec x 30   TB Sidelying Clamshells        TB Bridges MTB 5 sec x 10 MTB 5 sec x 10              DKTC 5 sec x 10  DC to HEP   5 sec x 10           Supine Sciatic N [place] At home DC to HEP   2 sec x 10 ea   Supine TFL stretch  15 sec x 3 ea NV   NV           [place] 2 sec; 2x10 2 sec; 2x10 2 sec; 2x10  2 sec x 10           Seated or Supine HS stretch Supine c SOS: 30 sec x 3 ea Supine c SOS: 30 sec x 3 ea   Supine c SOS: 30 sec x 3 ea   Seated or Supine 2 way piriformis stretch  Seated: 30 sec x 3 (fig 4 only) Seated: 30 sec x 3 ea (fig 4 only)   Supine: 30 sec x 3 ea   Seated Forward Trunk Flexion using green PB 3 way 3 way : 2 sec x 5 ea 3 way: 2 sec x 5 ea   3 way: 2 sec x 5 ea           Slant board gastroc-soleus stretch  20 sec x 3 ea      Squats        Lunges                HEP/EDU        Modalities        MH 20 mins in hooklying with there ex 20 mins in hooklying with there ex 15 mins in prone with lumbar extension with performing glute sets (30) f/b prone quad stretch  20 mins in hooklying with there ex   CP        EStim           Skin checks performed pre and post application: intact

## 2022-06-02 ENCOUNTER — OFFICE VISIT (OUTPATIENT)
Dept: PHYSICAL THERAPY | Facility: CLINIC | Age: 37
End: 2022-06-02
Payer: COMMERCIAL

## 2022-06-02 DIAGNOSIS — M54.50 CHRONIC BILATERAL LOW BACK PAIN WITHOUT SCIATICA: Primary | ICD-10-CM

## 2022-06-02 DIAGNOSIS — G89.29 CHRONIC BILATERAL LOW BACK PAIN WITHOUT SCIATICA: Primary | ICD-10-CM

## 2022-06-02 DIAGNOSIS — M62.81 MUSCLE WEAKNESS (GENERALIZED): ICD-10-CM

## 2022-06-02 PROCEDURE — 97110 THERAPEUTIC EXERCISES: CPT | Performed by: PHYSICAL THERAPIST

## 2022-06-02 PROCEDURE — 97140 MANUAL THERAPY 1/> REGIONS: CPT | Performed by: PHYSICAL THERAPIST

## 2022-06-02 PROCEDURE — 97112 NEUROMUSCULAR REEDUCATION: CPT | Performed by: PHYSICAL THERAPIST

## 2022-06-02 NOTE — PROGRESS NOTES
Daily Note     Today's date: 2022  Patient name: Brian Starkey  : 1985  MRN: [id number]  Referring provider: Jacquie Moeller PA-C  Dx:   Encounter Diagnosis     ICD-10-CM    1  Chronic bilateral low back pain without sciatica  M54 50     G89 29    2  Muscle weakness (generalized)  M62 81                   Subjective: Patient reports that he continues to have L sided SI region pain  Objective: See treatment diary below      Assessment: Tolerated treatment well; initiated POC with MH to low back while in prolonged lumbar extension  MT performed after receiving consent from pt; he reports having increased tenderness with palpation of his L glute and L calf which slightly improves post TPR  VCs provided on proper sequencing with exercises; encouraged him to perform self TPR to calves f/b stretching at home  Patient demonstrated fatigue post treatment and would benefit from continued PT      Plan: Continue per plan of care        HEP: L lateral side gliding in standing, PERFECTO; PPU    Specialty Daily Treatment Diary       Manual     L sided positional distraction   SMF     L reverse [place] jt mob        PA glides lumbar spine SMF SMF SMF SMF    TPR L glute in sidelying  SMF SMF SMF    [place] tail lumbar paraspinals        [place]iger tail L calf and hamstring  SMF      TPR L calf in prone  SMF      L LAD in prone SMF in prone L only Grade IV-V jt mobs SMF in prone L only Grade IV-V jt mobs SMF in prone and supine SMF in prone and supine    TPR B lumbar paraspinals                 OP with PPU   SMF 5 sec x 10 SMF    Guarded L lateral side glide with pushing hips to the right        Low back mobs with BLE press down and then rock to the side for L side   SMF             Exercise Diary                 TERT with lumbar extension   10 mins 10 mins    Prone glute sets   5 sec x 30 5 sec x 30     Prone quad stretch c SOS   30 sec x 3 ea 30 sec x 3 ea                    PERFECTO-PPU   5 sec x 10 5 sec x 10     OP c PPU   5 sec x 10  5 sec x 10    Prone H' Ext                 Standing Doorway L lateral trunk gliding (R arm on doorway with hips dropping to the right)   10 ea side     Standing Lumbar extension combo with L backward lean        Standing forward trunk flexion off to the right   2 sec x 5              TAC        TAC c Marching on table        TAC c marching LEs off table        TAC c OH reach with BLE lifted off table  5# 10 (BLE lifted off table slight increased sxs); 5# 20 (BLE on table)              TAC c SLR                TAC c Dying Bug                TAC c LTR -> Lift BLEs off table                        Cross Leg Rotation stretch NV 20 sec x 3 ea 30 sec x 3 ea     Ball Squeezes 10 sec x 30 10 sec x 30 10 sec x 20 10 sec x 20    Ball c bridge  5 sec x 10   5 sec x 10    TB Hooklying Clamshells MTB 10 sec x 30   MTB 10 sec x 30 MTB 10 sec x 20 MTB 10 sec x 20    TB Sidelying Clamshells        TB Bridges MTB 5 sec x 10 MTB 5 sec x 10  MTB 5 sec x 10            DKTC 5 sec x 10  DC to HEP              Supine Sciatic N [place] At home DC to HEP      Supine TFL stretch  15 sec x 3 ea NV              [place] 2 sec; 2x10 2 sec; 2x10 2 sec; 2x10 2 sec; 2x10            Seated or Supine HS stretch Supine c SOS: 30 sec x 3 ea Supine c SOS: 30 sec x 3 ea  Supine c SOS: 30 sec x 3 ea    Seated or Supine 2 way piriformis stretch  Seated: 30 sec x 3 (fig 4 only) Seated: 30 sec x 3 ea (fig 4 only)  Seated: 30 sec x 3 ea (fig 4 only)    Seated Forward Trunk Flexion using green PB 3 way 3 way : 2 sec x 5 ea 3 way: 2 sec x 5 ea              Slant board gastroc-soleus stretch  20 sec x 3 ea  30 sec x 3 ea    Squats        Lunges                HEP/EDU        Modalities        MH 20 mins in hooklying with there ex 20 mins in hooklying with there ex 15 mins in prone with lumbar extension with performing glute sets (30) f/b prone quad stretch 15 mins in prone with lumbar extension with performing    CP EStim           Skin checks performed pre and post application: intact

## 2022-06-06 ENCOUNTER — OFFICE VISIT (OUTPATIENT)
Dept: PHYSICAL THERAPY | Facility: CLINIC | Age: 37
End: 2022-06-06
Payer: COMMERCIAL

## 2022-06-06 DIAGNOSIS — G89.29 CHRONIC BILATERAL LOW BACK PAIN WITHOUT SCIATICA: Primary | ICD-10-CM

## 2022-06-06 DIAGNOSIS — M62.81 MUSCLE WEAKNESS (GENERALIZED): ICD-10-CM

## 2022-06-06 DIAGNOSIS — M54.50 CHRONIC BILATERAL LOW BACK PAIN WITHOUT SCIATICA: Primary | ICD-10-CM

## 2022-06-06 PROCEDURE — 97112 NEUROMUSCULAR REEDUCATION: CPT | Performed by: PHYSICAL THERAPIST

## 2022-06-06 PROCEDURE — 97110 THERAPEUTIC EXERCISES: CPT | Performed by: PHYSICAL THERAPIST

## 2022-06-06 PROCEDURE — 97140 MANUAL THERAPY 1/> REGIONS: CPT | Performed by: PHYSICAL THERAPIST

## 2022-06-06 NOTE — PROGRESS NOTES
Daily Note     Today's date: 2022  Patient name: Carine Infante  : 1985  MRN: [id number]  Referring provider: Brian Haile PA-C  Dx:   Encounter Diagnosis     ICD-10-CM    1  Chronic bilateral low back pain without sciatica  M54 50     G89 29    2  Muscle weakness (generalized)  M62 81                   Subjective: Patient reports that he still has stiffness across his low back when he wakes up in the morning and then stiffness when he sits for prolonged periods  Objective: See treatment diary below      Assessment: Tolerated treatment well; initiated POC with MH to low back in prolonged position  VCs provided on proper sequencing with exercises; he reports having increased discomfort along the lateral aspect of his thigh in to his calf when he activates his core and glutes  Due to increased pain with ball and bridges, he was instructed to perform self TPR using lacrosse ball to glute and calf f/b stretching  Patient demonstrated fatigue post treatment and would benefit from continued PT      Plan: Continue per plan of care        HEP: L lateral side gliding in standing, PERFECTO; PPU    Specialty Daily Treatment Diary       Manual    L sided positional distraction   SMF     L reverse [place] jt mob        PA glides lumbar spine SMF SMF SMF SMF SMF   TPR L glute in sidelying  SMF SMF SMF SMF in prone   Tiger tail lumbar paraspinals        Tiger tail L calf and hamstring  SMF      TPR L calf in prone  SMF      L LAD in prone SMF in prone L only Grade IV-V jt mobs SMF in prone L only Grade IV-V jt mobs SMF in prone and supine SMF in prone and supine SMF in prone and supine   TPR B lumbar paraspinals                 OP with PPU   SMF 5 sec x 10 SMF SMF 5 sec x 10   Guarded L lateral side glide with pushing hips to the right        Low back mobs with BLE press down and then rock to the side for L side   SMF             Exercise Diary                 TERT with lumbar extension 10 mins 10 mins 10 mins   Prone glute sets   5 sec x 30 5 sec x 30  5 sec x 30   Prone quad stretch c SOS   30 sec x 3 ea 30 sec x 3 ea 30 sec x 3 ea                   PERFECTO-PPU   5 sec x 10 5 sec x 10  5 sec x 10   OP c PPU   5 sec x 10  5 sec x 10 5 sec x 10    Prone H' Ext                 Standing Doorway L lateral trunk gliding (R arm on doorway with hips dropping to the right)   10 ea side     Standing Lumbar extension combo with L backward lean        Standing forward trunk flexion off to the right   2 sec x 5              TAC        TAC c Marching on table        TAC c marching LEs off table     NV   TAC c OH reach with BLE lifted off table  5# 10 (BLE lifted off table slight increased sxs); 5# 20 (BLE on table)   NV           TAC c SLR                TAC c Dying Bug                TAC c LTR -> Lift BLEs off table                        Cross Leg Rotation stretch NV 20 sec x 3 ea 30 sec x 3 ea  20 sec x 3 (stretching L only)   Ball Squeezes 10 sec x 30 10 sec x 30 10 sec x 20 10 sec x 20 10 sec x 20   Ball c bridge  5 sec x 10   5 sec x 10 5 sec x 15   TB Hooklying Clamshells MTB 10 sec x 30   MTB 10 sec x 30 MTB 10 sec x 20 MTB 10 sec x 20 MTB 10 sec x 20   TB Sidelying Clamshells        TB Bridges MTB 5 sec x 10 MTB 5 sec x 10  MTB 5 sec x 10 MTB 5 sec x 15           DKTC 5 sec x 10  DC to HEP              Supine Sciatic N [place] At home DC to HEP      Supine TFL stretch  15 sec x 3 ea NV              [place] 2 sec; 2x10 2 sec; 2x10 2 sec; 2x10 2 sec; 2x10 2 sec; 2x10           Seated or Supine HS stretch Supine c SOS: 30 sec x 3 ea Supine c SOS: 30 sec x 3 ea  Supine c SOS: 30 sec x 3 ea Supine c SOS: 30 sec x 3 ea   Seated or Supine 2 way piriformis stretch  Seated: 30 sec x 3 (fig 4 only) Seated: 30 sec x 3 ea (fig 4 only)  Seated: 30 sec x 3 ea (fig 4 only) Seated: 30 sec x 3 ea (fig 4 only)    Seated Forward Trunk Flexion using green PB 3 way 3 way : 2 sec x 5 ea 3 way: 2 sec x 5 ea Slant board gastroc-soleus stretch  20 sec x 3 ea  30 sec x 3 ea 30 sec x 3 ea   Squats        Lunges                HEP/EDU        Modalities        MH 20 mins in hooklying with there ex 20 mins in hooklying with there ex 15 mins in prone with lumbar extension with performing glute sets (30) f/b prone quad stretch 15 mins in prone with lumbar extension with performing 15 mins in prone with lumbar extension with performing there ex   CP        EStim           Skin checks performed pre and post application: intact

## 2022-06-09 ENCOUNTER — APPOINTMENT (OUTPATIENT)
Dept: PHYSICAL THERAPY | Facility: CLINIC | Age: 37
End: 2022-06-09
Payer: COMMERCIAL

## 2022-06-09 ENCOUNTER — OFFICE VISIT (OUTPATIENT)
Dept: FAMILY MEDICINE CLINIC | Facility: CLINIC | Age: 37
End: 2022-06-09
Payer: COMMERCIAL

## 2022-06-09 VITALS
WEIGHT: 205 LBS | DIASTOLIC BLOOD PRESSURE: 72 MMHG | HEART RATE: 98 BPM | OXYGEN SATURATION: 97 % | BODY MASS INDEX: 30.36 KG/M2 | SYSTOLIC BLOOD PRESSURE: 122 MMHG | TEMPERATURE: 98.5 F | HEIGHT: 69 IN

## 2022-06-09 DIAGNOSIS — M54.42 CHRONIC LEFT-SIDED LOW BACK PAIN WITH LEFT-SIDED SCIATICA: Primary | ICD-10-CM

## 2022-06-09 DIAGNOSIS — G89.29 CHRONIC LEFT-SIDED LOW BACK PAIN WITH LEFT-SIDED SCIATICA: Primary | ICD-10-CM

## 2022-06-09 DIAGNOSIS — M54.16 LUMBAR RADICULOPATHY: ICD-10-CM

## 2022-06-09 DIAGNOSIS — M25.552 LEFT HIP PAIN: ICD-10-CM

## 2022-06-09 PROCEDURE — 99213 OFFICE O/P EST LOW 20 MIN: CPT | Performed by: PHYSICIAN ASSISTANT

## 2022-06-09 RX ORDER — CYCLOBENZAPRINE HCL 10 MG
10 TABLET ORAL 3 TIMES DAILY PRN
Qty: 60 TABLET | Refills: 3 | Status: SHIPPED | OUTPATIENT
Start: 2022-06-09

## 2022-06-09 RX ORDER — NAPROXEN 500 MG/1
500 TABLET ORAL 2 TIMES DAILY WITH MEALS
Qty: 60 TABLET | Refills: 3 | Status: SHIPPED | OUTPATIENT
Start: 2022-06-09

## 2022-06-09 NOTE — PROGRESS NOTES
Assessment/Plan:         Problem List Items Addressed This Visit    None     Visit Diagnoses     Chronic left-sided low back pain with left-sided sciatica    -  Primary    Relevant Medications    cyclobenzaprine (FLEXERIL) 10 mg tablet    naproxen (EC NAPROSYN) 500 MG EC tablet    Other Relevant Orders    Ambulatory Referral to Physical Therapy    Left hip pain        Relevant Medications    cyclobenzaprine (FLEXERIL) 10 mg tablet    naproxen (EC NAPROSYN) 500 MG EC tablet    Other Relevant Orders    Ambulatory Referral to Physical Therapy          Discussed recommendations for hip and low back pain-  If   Therapy does not help, after 4-6 weeks, or makes sx  Worse  Will consider obtaining MRI and referring to pain specialist or   Comprehensive spine program        Subjective:      Patient ID: Haim Chao is a 39 y o  male  C/o left hip pain, since yesterday  And numbness affecting left knee down to feet  Getting physical therapy currently is for lower back  Sunday past lifted a tree branch, twisting to the right, did not sx  Until yesterday morning  Has had radicular pain down left leg, when flexing core, moving around  Trying advil with some relief  Unable to stand for prolonged periods  Was told to put heat on lower back area  Yesterday was trying to stretch, and had more pain  Admits to waking up with stiff low back, under more stress than usual          Review of Systems   Constitutional: Negative for chills, diaphoresis, fatigue and fever  Respiratory: Negative for cough, chest tightness and shortness of breath  Gastrointestinal: Negative for abdominal pain, constipation, diarrhea, nausea and vomiting  Genitourinary: Negative for dysuria, frequency, genital sores, penile swelling and scrotal swelling  Musculoskeletal: Positive for arthralgias, back pain, gait problem and myalgias  Negative for neck pain and neck stiffness  Left hip and low back pain  Psychiatric/Behavioral: The patient is nervous/anxious  Objective:      /72   Pulse 98   Temp 98 5 °F (36 9 °C) (Tympanic)   Ht 5' 9" (1 753 m)   Wt 93 kg (205 lb)   SpO2 97%   BMI 30 27 kg/m²          Physical Exam  Vitals reviewed  Constitutional:       General: He is not in acute distress  Appearance: Normal appearance  He is not ill-appearing, toxic-appearing or diaphoretic  HENT:      Head: Normocephalic and atraumatic  Pulmonary:      Effort: Pulmonary effort is normal    Musculoskeletal:         General: Tenderness present  No deformity or signs of injury  Normal range of motion  Right lower leg: No edema  Left lower leg: No edema  Comments: Tenderness noted on palpation of left hip area  Neurological:      General: No focal deficit present  Mental Status: He is alert and oriented to person, place, and time  Cranial Nerves: No cranial nerve deficit  Sensory: No sensory deficit  Motor: No weakness  Coordination: Coordination normal    Psychiatric:         Mood and Affect: Mood normal          Behavior: Behavior normal          Thought Content:  Thought content normal          Judgment: Judgment normal

## 2022-06-10 ENCOUNTER — TELEPHONE (OUTPATIENT)
Dept: PAIN MEDICINE | Facility: CLINIC | Age: 37
End: 2022-06-10

## 2022-06-10 ENCOUNTER — HOSPITAL ENCOUNTER (EMERGENCY)
Facility: HOSPITAL | Age: 37
Discharge: HOME | End: 2022-06-10
Attending: EMERGENCY MEDICINE
Payer: COMMERCIAL

## 2022-06-10 VITALS
BODY MASS INDEX: 30.28 KG/M2 | OXYGEN SATURATION: 98 % | DIASTOLIC BLOOD PRESSURE: 70 MMHG | SYSTOLIC BLOOD PRESSURE: 128 MMHG | WEIGHT: 205.03 LBS | HEART RATE: 74 BPM | RESPIRATION RATE: 20 BRPM | TEMPERATURE: 97.3 F

## 2022-06-10 DIAGNOSIS — M54.16 LUMBAR RADICULOPATHY, ACUTE: Primary | ICD-10-CM

## 2022-06-10 PROCEDURE — 99284 EMERGENCY DEPT VISIT MOD MDM: CPT | Performed by: PHYSICIAN ASSISTANT

## 2022-06-10 PROCEDURE — 99283 EMERGENCY DEPT VISIT LOW MDM: CPT

## 2022-06-10 RX ORDER — METHYLPREDNISOLONE 4 MG/1
TABLET ORAL
Qty: 21 TABLET | Refills: 0 | Status: SHIPPED | OUTPATIENT
Start: 2022-06-10

## 2022-06-10 NOTE — DISCHARGE INSTRUCTIONS
Rest, heating pad to back  Continue current medications and started medrol dose pack  Call pain management and schedule a follow up appointment  Return to ER if bowel/bladder dysfunction or weakness or groin numbness

## 2022-06-10 NOTE — TELEPHONE ENCOUNTER
PLEASE MAIL MAIN JANG OUT TO ADDRESS ON FILE     No pain med's,No pain 's, Read disclaimer      Appt: 6/22/22

## 2022-06-10 NOTE — ED PROVIDER NOTES
History  Chief Complaint   Patient presents with    Leg Pain     To ED with c/o left leg pain started Wednesday  Is being treated for left back pain x 2 weeks  Was evaluated by PCP yesterday and given flexeril and "a pain pill" Has not filled RX     Patient is a 38 y/o M with h/o back pain for 2 years, currently in PT for his pain presents to the ED with worsening pain  He states 5 days ago he was twisting and felt a pop in his back and now has worsening pain in his left lower back and left hip that radiates down his left leg  He denies bowel/bladder dysfunction or groin numbness  He states he has numbness in his left calf if he stands for too long  Pain is better when sitting down  He has been taking flexeril for pain  History provided by:  Patient  Back Pain  Location:  Sacro-iliac joint  Quality:  Aching  Radiates to: left leg  Pain severity:  Moderate  Onset quality:  Gradual  Duration: 2 years  Timing:  Constant  Progression:  Unchanged  Chronicity:  Chronic  Context: twisting    Relieved by:  Being still (sitting)  Worsened by: Movement and ambulation  Ineffective treatments:  Muscle relaxants and OTC medications  Associated symptoms: leg pain, numbness (left calf) and pelvic pain (left hip pain)    Associated symptoms: no abdominal pain, no abdominal swelling, no bladder incontinence, no bowel incontinence, no chest pain, no dysuria, no fever, no headaches, no perianal numbness, no tingling, no weakness and no weight loss    Risk factors: not obese        Prior to Admission Medications   Prescriptions Last Dose Informant Patient Reported? Taking?    cyclobenzaprine (FLEXERIL) 10 mg tablet   No No   Sig: Take 1 tablet (10 mg total) by mouth 3 (three) times a day as needed for muscle spasms   naproxen (EC NAPROSYN) 500 MG EC tablet   No No   Sig: Take 1 tablet (500 mg total) by mouth 2 (two) times a day with meals   triamcinolone (KENALOG) 0 1 % cream   No No   Si times daily prn flares of poison ivy for 2-3 weeks then take 1 week break; repeat prn   Patient not taking: Reported on 6/9/2022      Facility-Administered Medications: None       History reviewed  No pertinent past medical history  History reviewed  No pertinent surgical history  History reviewed  No pertinent family history  I have reviewed and agree with the history as documented  E-Cigarette/Vaping    E-Cigarette Use Never User      E-Cigarette/Vaping Substances    Nicotine No     Flavoring No      Social History     Tobacco Use    Smoking status: Never Smoker    Smokeless tobacco: Never Used   Vaping Use    Vaping Use: Never used   Substance Use Topics    Alcohol use: Yes     Comment: infrequent    Drug use: Never       Review of Systems   Constitutional: Negative for chills, fever and weight loss  Cardiovascular: Negative for chest pain  Gastrointestinal: Negative for abdominal pain, bowel incontinence, nausea and vomiting  Genitourinary: Positive for pelvic pain (left hip pain)  Negative for bladder incontinence and dysuria  Musculoskeletal: Positive for back pain  Negative for neck pain  Skin: Negative for color change, pallor and rash  Neurological: Positive for numbness (left calf)  Negative for dizziness, tingling, syncope, facial asymmetry, speech difficulty, weakness, light-headedness and headaches  Psychiatric/Behavioral: Negative for confusion  All other systems reviewed and are negative  Physical Exam  Physical Exam  Vitals and nursing note reviewed  Constitutional:       General: He is not in acute distress  Appearance: Normal appearance  He is well-developed, well-groomed and normal weight  He is not ill-appearing or diaphoretic  HENT:      Head: Normocephalic and atraumatic        Right Ear: External ear normal       Left Ear: External ear normal       Nose: Nose normal    Eyes:      Conjunctiva/sclera: Conjunctivae normal    Cardiovascular:      Rate and Rhythm: Normal rate and regular rhythm  Heart sounds: Normal heart sounds  Pulmonary:      Effort: Pulmonary effort is normal       Breath sounds: Normal breath sounds  No wheezing, rhonchi or rales  Abdominal:      General: Abdomen is flat  Bowel sounds are normal       Palpations: Abdomen is soft  Tenderness: There is no abdominal tenderness  Musculoskeletal:      Cervical back: Normal, normal range of motion and neck supple  No spinous process tenderness or muscular tenderness  Thoracic back: Normal       Lumbar back: Tenderness (over the SI joint) present  No swelling or bony tenderness  Normal range of motion  Negative right straight leg raise test and negative left straight leg raise test    Skin:     General: Skin is warm and dry  Coloration: Skin is not pale  Findings: No bruising, erythema or rash  Neurological:      Mental Status: He is alert and oriented to person, place, and time  Motor: Motor function is intact  No weakness, tremor or abnormal muscle tone  Gait: Gait is intact  Deep Tendon Reflexes:      Reflex Scores:       Patellar reflexes are 2+ on the right side and 2+ on the left side  Comments: Patient able to dorsiflex b/l feet  Patient has decreased sensation to left medial calf  Psychiatric:         Mood and Affect: Mood normal          Speech: Speech normal          Behavior: Behavior is cooperative           Vital Signs  ED Triage Vitals [06/10/22 1337]   Temperature Pulse Respirations Blood Pressure SpO2   (!) 97 3 °F (36 3 °C) 74 20 128/70 98 %      Temp Source Heart Rate Source Patient Position - Orthostatic VS BP Location FiO2 (%)   Tympanic Monitor Lying Left arm --      Pain Score       10 - Worst Possible Pain           Vitals:    06/10/22 1337   BP: 128/70   Pulse: 74   Patient Position - Orthostatic VS: Lying         Visual Acuity      ED Medications  Medications - No data to display    Diagnostic Studies  Results Reviewed     None No orders to display              Procedures  Procedures         ED Course                               SBIRT 22yo+    Flowsheet Row Most Recent Value   SBIRT (23 yo +)    In order to provide better care to our patients, we are screening all of our patients for alcohol and drug use  Would it be okay to ask you these screening questions? Yes Filed at: 06/10/2022 1350   Initial Alcohol Screen: US AUDIT-C     1  How often do you have a drink containing alcohol? 0 Filed at: 06/10/2022 1350   2  How many drinks containing alcohol do you have on a typical day you are drinking? 0 Filed at: 06/10/2022 1350   3a  Male UNDER 65: How often do you have five or more drinks on one occasion? 0 Filed at: 06/10/2022 1350   3b  FEMALE Any Age, or MALE 65+: How often do you have 4 or more drinks on one occassion? 0 Filed at: 06/10/2022 1350   Audit-C Score 0 Filed at: 06/10/2022 1350   YONATHAN: How many times in the past year have you    Used an illegal drug or used a prescription medication for non-medical reasons? Never Filed at: 06/10/2022 1350                    MDM  Number of Diagnoses or Management Options  Lumbar radiculopathy, acute: established and worsening  Diagnosis management comments: Patient with low back pain, worsening, no bowel/bladder dysfunction, no weakness, no need for emergent MRI, will prescribe medrol dose pack and refer to pain management  Strict return precautions given  Patient Progress  Patient progress: stable      Disposition  Final diagnoses:   Lumbar radiculopathy, acute     Time reflects when diagnosis was documented in both MDM as applicable and the Disposition within this note     Time User Action Codes Description Comment    6/10/2022  1:55 PM Jayesh Blandon Add [E04 11] Lumbar radiculopathy, acute       ED Disposition     ED Disposition   Discharge    Condition   Stable    Date/Time   Fri Lul 10, 2022  1:55 PM    Comment   Saskia Rangel discharge to home/self care  Follow-up Information     Follow up With Specialties Details Why Contact Info    Arvind Solares DO Pain Medicine Schedule an appointment as soon as possible for a visit  For recheck [address] [facility] [address]  [address] [place] [address] Drive [address] [phone]            Discharge Medication List as of 6/10/2022  1:57 PM      START taking these medications    Details   methylprednisolone (MEDROL) 4 mg tablet Medrol dose pack:  Take as directed , Normal         CONTINUE these medications which have NOT CHANGED    Details   cyclobenzaprine (FLEXERIL) 10 mg tablet Take 1 tablet (10 mg total) by mouth 3 (three) times a day as needed for muscle spasms, Starting Thu 6/9/2022, Normal      naproxen (EC NAPROSYN) 500 MG EC tablet Take 1 tablet (500 mg total) by mouth 2 (two) times a day with meals, Starting Thu 6/9/2022, Normal      triamcinolone (KENALOG) 0 1 % cream 2 times daily prn flares of poison ivy for 2-3 weeks then take 1 week break; repeat prn, Normal             No discharge procedures on file      PDMP Review     None          ED Provider  Electronically Signed by           Vanessa Peralta PA-C  06/10/22 9366

## 2022-06-13 ENCOUNTER — APPOINTMENT (OUTPATIENT)
Dept: PHYSICAL THERAPY | Facility: CLINIC | Age: 37
End: 2022-06-13
Payer: COMMERCIAL

## 2022-06-16 ENCOUNTER — OFFICE VISIT (OUTPATIENT)
Dept: PHYSICAL THERAPY | Facility: CLINIC | Age: 37
End: 2022-06-16
Payer: COMMERCIAL

## 2022-06-16 DIAGNOSIS — M54.50 CHRONIC BILATERAL LOW BACK PAIN WITHOUT SCIATICA: Primary | ICD-10-CM

## 2022-06-16 DIAGNOSIS — G89.29 CHRONIC BILATERAL LOW BACK PAIN WITHOUT SCIATICA: Primary | ICD-10-CM

## 2022-06-16 DIAGNOSIS — M62.81 MUSCLE WEAKNESS (GENERALIZED): ICD-10-CM

## 2022-06-16 PROCEDURE — 97140 MANUAL THERAPY 1/> REGIONS: CPT | Performed by: PHYSICAL THERAPIST

## 2022-06-16 PROCEDURE — 97110 THERAPEUTIC EXERCISES: CPT | Performed by: PHYSICAL THERAPIST

## 2022-06-16 NOTE — PROGRESS NOTES
Daily Note     Today's date: 2022  Patient name: Dyan Cifuentes  : 1985  MRN: [id number]  Referring provider: Bernadette Marsh PA-C  Dx:   Encounter Diagnosis     ICD-10-CM    1  Chronic bilateral low back pain without sciatica  M54 50     G89 29    2  Muscle weakness (generalized)  M62 81                   Subjective: Patient reports that last Wednesday he woke up and have significant lower leg pain and unable to bear weight  He went to PCP where he was prescribed pain medication  His sxs progressed and then went to ED  Objective: See treatment diary below      Assessment: Tolerated treatment well; initiated POC with MH to low back in prolonged extension with table elevated  VCs provided on proper sequencing with exercises; session focused on extension bias program  MT performed after receiving consent from pt; OP performed while pt performs press ups  He was instructed to avoid any length of sitting and to perform only press ups throughout the day especially when he has increased sxs  He was instructed to stop if sxs worsen  He reports having less lower leg pain post session  Patient demonstrated fatigue post treatment and would benefit from continued PT      Plan: Continue per plan of care        HEP: L lateral side gliding in standing, PERFECTO; PPU    Specialty Daily Treatment Diary       Manual    L sided positional distraction        L reverse [place] jt mob        PA glides lumbar spine SMF   SMF SMF   TPR L glute in sidelying SMF in prone   SMF SMF in prone   Tiger tail lumbar paraspinals        Tiger tail L calf and hamstring        TPR L calf in prone        L LAD in prone    SMF in prone and supine SMF in prone and supine   TPR B lumbar paraspinals                 OP with PPU SMF (5 sec; 2x10)   SMF SMF 5 sec x 10   Guarded L lateral side glide with pushing hips to the right        Low back mobs with BLE press down and then rock to the side for L side Exercise Diary                 TERT with lumbar extension 10 mins   10 mins 10 mins   Prone glute sets 5 sec cx 30   5 sec x 30  5 sec x 30   Prone quad stretch c SOS 30 sec x 3 ea   30 sec x 3 ea 30 sec x 3 ea                   PERFECTO-PPU 5 sec; 3x10   5 sec x 10  5 sec x 10   OP c PPU Blue strap: 5 sec; 3x10   5 sec x 10 5 sec x 10    Prone H' Ext                 Standing Doorway L lateral trunk gliding (R arm on doorway with hips dropping to the right)        Standing Lumbar extension combo with L backward lean        Standing forward trunk flexion off to the right                TAC        TAC c Marching on table        TAC c marching LEs off table     NV   TAC c OH reach with BLE lifted off table     NV           TAC c SLR                TAC c Dying Bug                TAC c LTR -> Lift BLEs off table                        Cross Leg Rotation stretch     20 sec x 3 (stretching L only)   Ball Squeezes    10 sec x 20 10 sec x 20   Ball c bridge    5 sec x 10 5 sec x 15   TB Hooklying Clamshells    MTB 10 sec x 20 MTB 10 sec x 20   TB Sidelying Clamshells        TB Bridges    MTB 5 sec x 10 MTB 5 sec x 15           DKTC                Supine Sciatic N Glide        Supine TFL stretch                 Hand Heel Rocks    2 sec; 2x10 2 sec; 2x10           Seated or Supine HS stretch    Supine c SOS: 30 sec x 3 ea Supine c SOS: 30 sec x 3 ea   Seated or Supine 2 way piriformis stretch     Seated: 30 sec x 3 ea (fig 4 only) Seated: 30 sec x 3 ea (fig 4 only)    Seated Forward Trunk Flexion using green PB 3 way                Slant board gastroc-soleus stretch    30 sec x 3 ea 30 sec x 3 ea   Squats        Lunges                HEP/EDU        Modalities        MH 15 mins in prone with lumbar extension with performing there ex   15 mins in prone with lumbar extension with performing 15 mins in prone with lumbar extension with performing there ex   CP        EStim           Skin checks performed pre and post application: intact

## 2022-06-20 ENCOUNTER — OFFICE VISIT (OUTPATIENT)
Dept: PHYSICAL THERAPY | Facility: CLINIC | Age: 37
End: 2022-06-20
Payer: COMMERCIAL

## 2022-06-20 DIAGNOSIS — M54.50 CHRONIC BILATERAL LOW BACK PAIN WITHOUT SCIATICA: Primary | ICD-10-CM

## 2022-06-20 DIAGNOSIS — G89.29 CHRONIC BILATERAL LOW BACK PAIN WITHOUT SCIATICA: Primary | ICD-10-CM

## 2022-06-20 DIAGNOSIS — M62.81 MUSCLE WEAKNESS (GENERALIZED): ICD-10-CM

## 2022-06-20 PROCEDURE — 97110 THERAPEUTIC EXERCISES: CPT | Performed by: PHYSICAL THERAPIST

## 2022-06-20 PROCEDURE — 97112 NEUROMUSCULAR REEDUCATION: CPT | Performed by: PHYSICAL THERAPIST

## 2022-06-20 PROCEDURE — 97140 MANUAL THERAPY 1/> REGIONS: CPT | Performed by: PHYSICAL THERAPIST

## 2022-06-20 NOTE — PROGRESS NOTES
Daily Note     Today's date: 2022  Patient name: Narayan Mejia  : 1985  MRN: [id number]  Referring provider: Sandy Ponce PA-C  Dx:   Encounter Diagnosis     ICD-10-CM    1  Chronic bilateral low back pain without sciatica  M54 50     G89 29    2  Muscle weakness (generalized)  M62 81                   Subjective: Patient reports that he continues to have low back discomfort and radiculopathy in to the anterior aspect of his shin  He reports that he did feel better after last visit  He reports that he has been decreasing his sitting and trying to perform more press ups  He continues to have increased sensitivities with weight bearing in the AM        Objective: See treatment diary below      Assessment: Tolerated treatment well; initiated POC with MH to low back in prolonged extension while performing glute sets and prone quad stretch  VCs provided on proper sequencing with exercises; resumed hip strengthening and added femoral nerve glides  He reports having increased pain after figure 4 stretch in to his lower shin  He performed prone press ups with overpressure and had decreased pain  He was instructed to only perform prone press ups and assess response  Patient demonstrated fatigue post treatment and would benefit from continued PT      Plan: Continue per plan of care        HEP: L lateral side gliding in standing, PERFECTO; PPU    Specialty Daily Treatment Diary       Manual    L sided positional distraction        L reverse [place] jt mob        PA glides lumbar spine SMF SMF   SMF   TPR L glute in sidelying SMF in prone SMF in prone   SMF in prone   Tiger tail lumbar paraspinals        Tiger tail L calf and hamstring        TPR L calf in prone        L LAD in prone     SMF in prone and supine   TPR B lumbar paraspinals                 OP with PPU SMF (5 sec; 2x10) SMF (5 sec; 2x10)   SMF 5 sec x 10   Guarded L lateral side glide with pushing hips to the right        Low back alanna with BLE press down and then rock to the side for L side                Exercise Diary                 TERT with lumbar extension 10 mins 10 mins   10 mins   Prone glute sets 5 sec cx 30 5 sec x 30   5 sec x 30   Prone quad stretch c SOS 30 sec x 3 ea 30 sec x 3 ea   30 sec x 3 ea                   PERFECTO-PPU 5 sec; 3x10 5 sec; 3x10    5 sec x 10   OP c PPU Blue strap: 5 sec; 3x10 Blue strap: 5 sec; 3x10   5 sec x 10    Prone H' Ext                 Standing lumbar extension  3x10 (against wall, pushing hips forward, and against table)      Standing Doorway L lateral trunk gliding (R arm on doorway with hips dropping to the right)        Standing Lumbar extension combo with L backward lean        Standing forward trunk flexion off to the right                TAC        TAC c Marching on table        TAC c marching LEs off table     NV   TAC c OH reach with BLE lifted off table     NV           TAC c SLR                TAC c Dying Bug                TAC c LTR -> Lift BLEs off table                        Cross Leg Rotation stretch     20 sec x 3 (stretching L only)   Ball Squeezes  10 sec x 20   10 sec x 20   Ball c bridge     5 sec x 15   TB Hooklying Clamshells  MTB 10 sec x 30   MTB 10 sec x 20   TB Sidelying Clamshells        TB Bridges     MTB 5 sec x 15           DKTC                Supine Sciatic N Glide  10 ea      Supine TFL stretch                 Hand Heel Rocks     2 sec; 2x10           Seated or Supine HS stretch     Supine c SOS: 30 sec x 3 ea   Seated or Supine 2 way piriformis stretch   Supine: 20 sec x 3 ea (p!  For the L side with increased radiculopathy)   Seated: 30 sec x 3 ea (fig 4 only)    Seated Forward Trunk Flexion using green PB 3 way                Slant board gastroc-soleus stretch     30 sec x 3 ea   Squats        Lunges                HEP/EDU        Modalities        MH 15 mins in prone with lumbar extension with performing there ex 15 mins in prone with lumbar extension with performing there ex   15 mins in prone with lumbar extension with performing there ex   CP        EStim           Skin checks performed pre and post application: intact

## 2022-06-22 ENCOUNTER — CONSULT (OUTPATIENT)
Dept: PAIN MEDICINE | Facility: CLINIC | Age: 37
End: 2022-06-22
Payer: COMMERCIAL

## 2022-06-22 VITALS
SYSTOLIC BLOOD PRESSURE: 130 MMHG | OXYGEN SATURATION: 98 % | DIASTOLIC BLOOD PRESSURE: 90 MMHG | HEIGHT: 69 IN | HEART RATE: 78 BPM | BODY MASS INDEX: 29.77 KG/M2 | WEIGHT: 201 LBS

## 2022-06-22 DIAGNOSIS — M54.16 LEFT LUMBAR RADICULITIS: Primary | ICD-10-CM

## 2022-06-22 PROCEDURE — 99204 OFFICE O/P NEW MOD 45 MIN: CPT | Performed by: ANESTHESIOLOGY

## 2022-06-22 NOTE — PROGRESS NOTES
Assessment  1  Left lumbar radiculitis        Plan      At this point the patient's pain persists despite time, relative rest, activity modification, and nonsteroidal anti-inflammatories  His pain is significantly interfering with his daily living activities  He is undergone extensive physical therapy has slight neurological deficit  It is appropriate to order an MRI of the lumbar spine to rule out any significant etiology of his symptoms  Patient is to contact our office or present to hospital Emergency Room if experience worsening or new neck/ upper extremity pain, low back / lower extremity pain, sensory or motor change, bladder or bowel dysfunction, or other neurological change  Once we obtain MRI results, I will follow-up with the patient, review the results and current symptoms, and discuss the next steps of the treatment plan  My impressions and treatment recommendations were discussed in detail with the patient who verbalized understanding and had no further questions  Discharge instructions were provided  I personally saw and examined the patient and I agree with the above discussed plan of care  This note is created using dictation transcription  It may contain typographical errors, grammatical errors, improperly dictated words, background noise and other errors  Orders Placed This Encounter   Procedures    MRI lumbar spine without contrast     Standing Status:   Future     Standing Expiration Date:   6/22/2026     Scheduling Instructions: There is no preparation for this test  Please leave your jewelry and valuables at home, wedding rings are the exception  Magnetic nail polish must be removed prior to arrival for your test  Please bring your insurance cards, a form of photo ID and a list of your medications with you  Arrive 15 minutes prior to your appointment time in order to register   Please bring any prior CT or MRI studies of this area that were not performed at [facility] facility  To schedule this appointment, please contact Central Scheduling at [phone]  Prior to your appointment, please make sure you complete the MRI Screening Form when you e-Check in for your appointment  This will be available starting 7 days before your appointment in [address]  You may receive an e-mail with an activation code if you do not have a [employer] account  If you do not have access to a device, we will complete your screening at your appointment  Order Specific Question:   What is the patient's sedation requirement? Answer:   No Sedation     Order Specific Question:   Release to patient through [employer]     Answer:   Immediate     Order Specific Question:   Is order priority selected as STAT? Answer:   No     Order Specific Question:   Reason for Exam (FREE TEXT)     Answer:   left lumbar radiculopathy     No orders of the defined types were placed in this encounter  History of Present Illness    Carine Infante is a 39 y o  male with approximate two year history of low back pain progressing to left lower extremity pain and weakness  He is unaware of any clear precipitating event denies any trauma or injury  His pain is severe he rates as 6/10 on the visual analog scale interfering with daily living activities  His intermittent he is undergone physical therapy with moderate but short-term intermittent relief  His pain is worse in the morning than the night describes shooting throbbing with a numbing sensation  Reports almost all activities including standing and bending aggravate his symptoms  Denies any loss of bowel or bladder function  I have personally reviewed and/or updated the patient's past medical history, past surgical history, family history, social history, current medications, allergies, and vital signs today  Review of Systems   Musculoskeletal: Positive for joint swelling (joint pain) and myalgias         There is no problem list on file for this patient  History reviewed  No pertinent past medical history  History reviewed  No pertinent surgical history  History reviewed  No pertinent family history  Social History     Occupational History    Not on file   Tobacco Use    Smoking status: Never Smoker    Smokeless tobacco: Never Used   Vaping Use    Vaping Use: Never used   Substance and Sexual Activity    Alcohol use: Yes     Comment: infrequent    Drug use: Never    Sexual activity: Not on file       Current Outpatient Medications on File Prior to Visit   Medication Sig    cyclobenzaprine (FLEXERIL) 10 mg tablet Take 1 tablet (10 mg total) by mouth 3 (three) times a day as needed for muscle spasms    naproxen (EC NAPROSYN) 500 MG EC tablet Take 1 tablet (500 mg total) by mouth 2 (two) times a day with meals    methylprednisolone (MEDROL) 4 mg tablet Medrol dose pack:  Take as directed  (Patient not taking: Reported on 6/22/2022)    triamcinolone (KENALOG) 0 1 % cream 2 times daily prn flares of poison ivy for 2-3 weeks then take 1 week break; repeat prn (Patient not taking: No sig reported)     No current facility-administered medications on file prior to visit  No Known Allergies    Physical Exam    /90 (BP Location: Left arm, Patient Position: Sitting, Cuff Size: Adult)   Pulse 78   Ht 5' 9" (1 753 m)   Wt 91 2 kg (201 lb)   SpO2 98%   BMI 29 68 kg/m²     Constitutional: normal, well developed, well nourished, alert, in no distress and non-toxic and no overt pain behavior    Eyes: anicteric  HEENT: grossly intact  Neck: supple, symmetric, trachea midline and no masses   Pulmonary:even and unlabored  Cardiovascular:No edema or pitting edema present  Skin:Normal without rashes or lesions and well hydrated  Psychiatric:Mood and affect appropriate  Neurologic:Cranial Nerves II-XII grossly intact  Musculoskeletal:normal, Difficulty going from sitting to standing sitting position; no obvious skin lesions or erythema lumbar sacral spine; mild tenderness in lumbar paravertebrals, no sacroiliac or greater trochanteric tenderness bilateral; deep tendon reflexes are diminished left patellar compared to the right symmetrical bilateral Achilles; 4/5 strength left plantar flexion; decreased sensation left L5 distribution to pinwheel; positive straight leg raising left  Imaging  XR spine lumbar minimum 4 views non injury 05/04/2022 @ [facility]  Narrative & Impression   LUMBAR SPINE     INDICATION:   M54 42: Lumbago with sciatica, left side  G89 29: Other chronic pain  M25 552: Pain in left hip      COMPARISON:  None     VIEWS:  XR SPINE LUMBAR MINIMUM 4 VIEWS NON INJURY        FINDINGS:     There are 5 non rib bearing lumbar vertebral bodies       There is no evidence of acute fracture or destructive osseous lesion      Alignment is unremarkable       No significant lumbar degenerative change noted      The pedicles appear intact      Phleboliths in the pelvis      IMPRESSION:     Unremarkable lumbar spine         XR hip/pelv 2-3 vws left if performed 05/04/2022 @ [facility]    Narrative & Impression   LEFT HIP     INDICATION:   M54 42: Lumbago with sciatica, left side  G89 29: Other chronic pain  M25 552: Pain in left hip      COMPARISON:  None     VIEWS:  XR HIP/PELV 2-3 VWS LEFT  W PELVIS IF PERFORMED         FINDINGS:     There is no acute fracture or dislocation      No significant hip degenerative changes      No lytic or blastic osseous lesion      Phleboliths in the pelvis      The visualized lumbar spine is unremarkable      IMPRESSION:     No acute osseous abnormality     I have personally reviewed pertinent films in PACS and my interpretation is Lumbar radiographs within normal

## 2022-06-23 ENCOUNTER — OFFICE VISIT (OUTPATIENT)
Dept: PHYSICAL THERAPY | Facility: CLINIC | Age: 37
End: 2022-06-23
Payer: COMMERCIAL

## 2022-06-23 DIAGNOSIS — M62.81 MUSCLE WEAKNESS (GENERALIZED): ICD-10-CM

## 2022-06-23 DIAGNOSIS — M54.50 CHRONIC BILATERAL LOW BACK PAIN WITHOUT SCIATICA: Primary | ICD-10-CM

## 2022-06-23 DIAGNOSIS — G89.29 CHRONIC BILATERAL LOW BACK PAIN WITHOUT SCIATICA: Primary | ICD-10-CM

## 2022-06-23 PROCEDURE — 97112 NEUROMUSCULAR REEDUCATION: CPT | Performed by: PHYSICAL THERAPIST

## 2022-06-23 PROCEDURE — 97140 MANUAL THERAPY 1/> REGIONS: CPT | Performed by: PHYSICAL THERAPIST

## 2022-06-23 PROCEDURE — 97110 THERAPEUTIC EXERCISES: CPT | Performed by: PHYSICAL THERAPIST

## 2022-06-23 NOTE — PROGRESS NOTES
Daily Note     Today's date: 2022  Patient name: Haim Chao  : 1985  MRN: [id number]  Referring provider: Jessie Gardner PA-C  Dx:   Encounter Diagnosis     ICD-10-CM    1  Chronic bilateral low back pain without sciatica  M54 50     G89 29    2  Muscle weakness (generalized)  M62 81                   Subjective: Patient reports his sxs are at a 5/10 with numbness and he is able to weight bear now in the AM with less pain  He also reports having less pain with sitting to standing transfers  He saw spine specialist yesterday who recommends that he has a MRI which is scheduled in two weeks from Saturday  Objective: See treatment diary below      Assessment: Tolerated treatment well; initiated POC with MH to low back in prolonged lumbar extension  VCs provided on proper sequencing with exercises; session focused on extension bias program and focused on TPR to glutes  He reports having a 3-4/10 with numbness in lower leg  He was instructed to avoid twisting movements and bending over positions over the weekend and continue to lumbar extension program   Patient demonstrated fatigue post treatment and would benefit from continued PT      Plan: Continue per plan of care        HEP: L lateral side gliding in standing, PERFECTO; PPU    Specialty Daily Treatment Diary       Manual      L sided positional distraction        L reverse Henlawson jt mob        PA glides lumbar spine SMF SMF SMF     TPR L glute in sidelying SMF in prone SMF in prone SMF in prone and sidelying     Tiger tail lumbar paraspinals        Tiger tail L calf and hamstring        TPR L calf in prone        L LAD in prone   SMF in supine     TPR B lumbar paraspinals                 OP with PPU SMF (5 sec; 2x10) SMF (5 sec; 2x10) SMF (5 sec; 3x10)     Guarded L lateral side glide with pushing hips to the right        Low back mobs with BLE press down and then rock to the side for L side        L piriformis stretch   NV Exercise Diary                 TERT with lumbar extension 10 mins 10 mins 10 mins      Prone glute sets 5 sec cx 30 5 sec x 30 5 sec x 30     Prone quad stretch c SOS 30 sec x 3 ea 30 sec x 3 ea 30 sec x 3 ea                     PERFECTO-PPU 5 sec; 3x10 5 sec; 3x10  5 sec; 3x10     OP c PPU Blue strap: 5 sec; 3x10 Blue strap: 5 sec; 3x10 Blue strap: 5 sec; 3x10     Prone H' Ext         Femoral N glides  10x 2x10             Standing lumbar extension  3x10 (against wall, pushing hips forward, and against table)      Standing Doorway L lateral trunk gliding (R arm on doorway with hips dropping to the right)        Standing Lumbar extension combo with L backward lean        Standing forward trunk flexion off to the right                TAC        TAC c Marching on table        TAC c marching LEs off table        TAC c OH reach with BLE lifted off table                TAC c SLR                TAC c Dying Bug                TAC c LTR -> Lift BLEs off table                        Cross Leg Rotation stretch        Ball Squeezes  10 sec x 20 10 sec x 20 (soccer)     Ball c bridge        TB Hooklying Clamshells  MTB 10 sec x 30 Purple TB: 10 sec x 30     TB Sidelying Clamshells        TB Bridges                DKTC                Supine Sciatic N Glide  10 ea 10 ea     Supine TFL stretch                 Hand Heel Rocks                Seated or Supine HS stretch   NV     Seated or Supine 2 way piriformis stretch   Supine: 20 sec x 3 ea (p! For the L side with increased radiculopathy) Held - NV perform manually?      Seated Forward Trunk Flexion using green PB 3 way                Slant board gastroc-soleus stretch        Squats        Lunges                HEP/EDU        Modalities        MH 15 mins in prone with lumbar extension with performing there ex 15 mins in prone with lumbar extension with performing there ex 15 mins in prone with lumbar extension with performing there ex     CP        EStim           Skin checks performed pre and post application: intact

## 2022-06-24 ENCOUNTER — OFFICE VISIT (OUTPATIENT)
Dept: DERMATOLOGY | Facility: CLINIC | Age: 37
End: 2022-06-24
Payer: COMMERCIAL

## 2022-06-24 VITALS — WEIGHT: 201.06 LBS | BODY MASS INDEX: 29.78 KG/M2 | TEMPERATURE: 98.2 F | HEIGHT: 69 IN

## 2022-06-24 DIAGNOSIS — D22.9 MULTIPLE BENIGN MELANOCYTIC NEVI: Primary | ICD-10-CM

## 2022-06-24 PROCEDURE — 1036F TOBACCO NON-USER: CPT | Performed by: DERMATOLOGY

## 2022-06-24 PROCEDURE — 99212 OFFICE O/P EST SF 10 MIN: CPT | Performed by: DERMATOLOGY

## 2022-06-24 PROCEDURE — 3008F BODY MASS INDEX DOCD: CPT | Performed by: DERMATOLOGY

## 2022-06-24 NOTE — PATIENT INSTRUCTIONS
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  If site regrows back and bothersome, in the future may need to re-excised  Biopsy for posterior shoulder came back as lentigo with pigment incontinence which means sun spot, benign  Last biopsy is posterior thigh, came back as lentiginous compound nevus with focal mild cytologic atypia, benign  Discussed monitoring the moles and the rest of your skin  If you notice any changes follow up with a skin check  Have yearly skin checks  We recommend wearing SPF 30+ daily  Apply SPF 30+ every 2 hours when in direct sunlight  Wearing sunglasses, a wide brim hat and UV protected clothing help prevent sun damage  Discussed following up in 4 months to look over at the biopsy spots  Melanocytic Nevi  Melanocytic nevi ("moles") are tan or brown, raised or flat areas of the skin which have an increased number of melanocytes  Melanocytes are the cells in our body which make pigment and account for skin color  Some moles are present at birth (I e , "congenital nevi"), while others come up later in life (i e , "acquired nevi")  The sun can stimulate the body to make more moles  Sunburns are not the only thing that triggers more moles  Chronic sun exposure can do it too  Clinically distinguishing a healthy mole from melanoma may be difficult, even for experienced dermatologists  The "ABCDE's" of moles have been suggested as a means of helping to alert a person to a suspicious mole and the possible increased risk of melanoma    The suggestions for raising alert are as follows:    Asymmetry: Healthy moles tend to be symmetric, while melanomas are often asymmetric  Asymmetry means if you draw a line through the mole, the two halves do not match in color, size, shape, or surface texture  Asymmetry can be a result of rapid enlargement of a mole, the development of a raised area on a previously flat lesion, scaling, ulceration, bleeding or scabbing within the mole  Any mole that starts to demonstrate "asymmetry" should be examined promptly by a board certified dermatologist      Border: Healthy moles tend to have discrete, even borders  The border of a melanoma often blends into the normal skin and does not sharply delineate the mole from normal skin  Any mole that starts to demonstrate "uneven borders" should be examined promptly by a board certified dermatologist      Color: Healthy moles tend to be one color throughout  Melanomas tend to be made up of different colors ranging from dark black, blue, white, or red  Any mole that demonstrates a color change should be examined promptly by a board certified dermatologist      Diameter: Healthy moles tend to be smaller than 0 6 cm in size; an exception are "congenital nevi" that can be larger  Melanomas tend to grow and can often be greater than 0 6 cm (1/4 of an inch, or the size of a pencil eraser)  This is only a guideline, and many normal moles may be larger than 0 6 cm without being unhealthy  Any mole that starts to change in size (small to bigger or bigger to smaller) should be examined promptly by a board certified dermatologist      Evolving: Healthy moles tend to "stay the same "  Melanomas may often show signs of change or evolution such as a change in size, shape, color, or elevation    Any mole that starts to itch, bleed, crust, burn, hurt, or ulcerate or demonstrate a change or evolution should be examined promptly by a board certified dermatologist       Dysplastic Nevi  Dysplastic moles are moles that fit the ABCDE rules of melanoma but are not identified as melanomas when examined under the microscope  They may indicate an increased risk of melanoma in that person  If there is a family history of melanoma, most experts agree that the person may be at an increased risk for developing a melanoma  Experts still do not agree on what dysplastic moles mean in patients without a personal or family history of melanoma  Dysplastic moles are usually larger than common moles and have different colors within it with irregular borders  The appearance can be very similar to a melanoma  Biopsies of dysplastic moles may show abnormalities which are different from a regular mole  Melanoma  Malignant melanoma is a type of skin cancer that can be deadly if it spreads throughout the body  The incidence of melanoma in the [place] is growing faster than any other cancer  Melanoma usually grows near the surface of the skin for a period of time, and then begins to grow deeper into the skin  Once it grows deeper into the skin, the risk of spread to other organs greatly increases  Therefore, early detection and removal of a malignant melanoma may result in a better chance at a complete cure; removal after the tumor has spread may not be as effective, leading to worse clinical outcomes such as death  The true rate of nevus transformation into a melanoma is unknown  It has been estimated that the lifetime risk for any acquired melanocytic nevus on any 21year-old individual transforming into melanoma by age [de-identified] is 0 03% (1 in 3,164) for men and 0 009% (1 in 10,800) for women  The appearance of a "new mole" remains one of the most reliable methods for identifying a malignant melanoma  Occasionally, melanomas appear as rapidly growing, blue-black, dome-shaped bumps within a previous mole or previous area of normal skin  Other times, melanomas are suspected when a mole suddenly appears or changes   Itching, burning, or pain in a pigmented lesion should increase suspicion, but most patients with early melanoma have no skin discomfort whatsoever  Melanoma can occur anywhere on the skin, including areas that are difficult for self-examination  Many melanomas are first noticed by other family members  Suspicious-looking moles may be removed for microscopic examination  You may be able to prevent death from melanoma by doing two simple things:    Try to avoid unnecessary sun exposure and protect your skin when it is exposed to the sun  People who live near the equator, people who have intermittent exposures to large amounts of sun, and people who have had sunburns in childhood or adolescence have an increased risk for melanoma  Sun sense and vigilant sun protection may be keys to helping to prevent melanoma  We recommend wearing UPF-rated sun protective clothing and sunglasses whenever possible and applying a moisturizer-sunscreen combination product (SPF 50+) such as Neutrogena Daily Defense to sun exposed areas of skin at least three times a day  Have your moles regularly examined by a board certified dermatologist AND by yourself or a family member/friend at home  We recommend that you have your moles examined at least once a year by a board certified dermatologist   Use your birthday as an annual reminder to have your "Birthday Suit" (I e , your skin) examined; it is a nice birthday gift to yourself to know that your skin is healthy appearing! Additionally, at-home self examinations may be helpful for detecting a possible melanoma  Use the ABCDEs we discussed and check your moles once a month at home

## 2022-06-24 NOTE — PROGRESS NOTES
[facility] Follow Up Note    Patient Name: Darrell Solorio  Encounter Date: 6/24/2022    Today's Chief Concerns:  Alberto Flanagan Concern #1:  Mole biopsy follow up    Current Medications:    Current Outpatient Medications:     cyclobenzaprine (FLEXERIL) 10 mg tablet, Take 1 tablet (10 mg total) by mouth 3 (three) times a day as needed for muscle spasms, Disp: 60 tablet, Rfl: 3    methylprednisolone (MEDROL) 4 mg tablet, Medrol dose pack:  Take as directed  (Patient not taking: Reported on 6/22/2022), Disp: 21 tablet, Rfl: 0    naproxen (EC NAPROSYN) 500 MG EC tablet, Take 1 tablet (500 mg total) by mouth 2 (two) times a day with meals, Disp: 60 tablet, Rfl: 3    triamcinolone (KENALOG) 0 1 % cream, 2 times daily prn flares of poison ivy for 2-3 weeks then take 1 week break; repeat prn (Patient not taking: No sig reported), Disp: 30 g, Rfl: 3    CONSTITUTIONAL:   Vitals:    06/24/22 1548   Temp: 98 2 °F (36 8 °C)   TempSrc: Temporal   Weight: 91 2 kg (201 lb 1 oz)   Height: 5' 9" (1 753 m)       Specific Alerts:    Have you been seen by a [facility]ologist in the last 3 years? YES    No Known Allergies    May we call your Preferred Phone number to discuss your specific medical information? YES    May we leave a detailed message that includes your specific medical information? YES    Have you traveled outside of the [facility] in the past 3 months? No    Do you currently have a pacemaker or defibrillator? No    Do you have any artificial heart valves, joints, plates, screws, rods, stents, pins, etc? No   - If Yes, were any placed within the last 2 years? Do you require any medications prior to a surgical procedure? No    Are you taking any medications that cause you to bleed more easily ("blood thinners") No    Have you ever experienced a rapid heartbeat with epinephrine? No    Review of Systems:  Have you recently had or currently have any of the following?     · Fever or chills: No  · Night Sweats: No  · Headaches: No  · Weight Gain: No  · Weight Loss: No  · Blurry Vision: No  · Nausea: No  · Vomiting: No  · Diarrhea: No  · Blood in Stool: No  · Abdominal Pain: No  · Itchy Skin: No  · Painful Joints: No  · Swollen Joints: No  · Muscle Pain: No  · Irregular Mole: No  · Sun Burn: No  · Dry Skin: No  · Skin Color Changes: No  · Scar or Keloid: No  · Cold Sores/Fever Blisters: No  · Bacterial Infections/MRSA: No  · Anxiety: No  · Depression: No  · Suicidal or Homicidal Thoughts: No      PSYCH: Normal mood and affect  EYES: Normal conjunctiva  ENT: Normal lips and oral mucosa  CARDIOVASCULAR: No edema  RESPIRATORY: Normal respirations  HEME/LYMPH/IMMUNO:  No regional lymphadenopathy except as noted below in ASSESSMENT AND PLAN BY DIAGNOSIS    FULL ORGAN SYSTEM SKIN EXAM (SKIN)   Right Arm/Hand/Fingers Normal except as noted below in Assessment   Left Arm/Hand/Fingers Normal except as noted below in Assessment       1  MELANOCYTIC NEVI ("Moles") - follow up from biopsy by Dr Ashley Antunez    Physical Exam:   Anatomic Location Affected:   Shoulder and hand  Morphological Description:                Additional History of Present Condition:  Patient had biopsy of 3 moles done by Dr Ashley Antunez on 5/17/2022  Patient is here to discuss options moving forward  Assessment and Plan:  Based on a thorough discussion of this condition and the management approach to it (including a comprehensive discussion of the known risks, side effects and potential benefits of treatment), the patient (family) agrees to implement the following specific plan:     Biopsy results for Later palm came back Acral compound nevus with mild to moderate atypia  If site regrows back and bothersome, in the future may need to re-excised  Biopsy for posterior shoulder came back as lentigo with pigment incontinence which means sun spot, benign   Last biopsy is posterior thigh, came back as lentiginous compound nevus with focal mild cytologic atypia, benign   Discussed monitoring the moles and the rest of your skin  If you notice any changes follow up with a skin check  Have yearly skin checks   We recommend wearing SPF 30+ daily  Apply SPF 30+ every 2 hours when in direct sunlight  Wearing sunglasses, a wide brim hat and UV protected clothing help prevent sun damage   Discussed following up in 4 months to look over at the biopsy spots   Biopsy sites clear, WNL today   Yearly skin checks  Sun precautions  Melanocytic Nevi  Melanocytic nevi ("moles") are tan or brown, raised or flat areas of the skin which have an increased number of melanocytes  Melanocytes are the cells in our body which make pigment and account for skin color  Some moles are present at birth (I e , "congenital nevi"), while others come up later in life (i e , "acquired nevi")  The sun can stimulate the body to make more moles  Sunburns are not the only thing that triggers more moles  Chronic sun exposure can do it too  Clinically distinguishing a healthy mole from melanoma may be difficult, even for experienced dermatologists  The "ABCDE's" of moles have been suggested as a means of helping to alert a person to a suspicious mole and the possible increased risk of melanoma  The suggestions for raising alert are as follows:    Asymmetry: Healthy moles tend to be symmetric, while melanomas are often asymmetric  Asymmetry means if you draw a line through the mole, the two halves do not match in color, size, shape, or surface texture  Asymmetry can be a result of rapid enlargement of a mole, the development of a raised area on a previously flat lesion, scaling, ulceration, bleeding or scabbing within the mole  Any mole that starts to demonstrate "asymmetry" should be examined promptly by a board certified dermatologist      Border: Healthy moles tend to have discrete, even borders    The border of a melanoma often blends into the normal skin and does not sharply delineate the mole from normal skin  Any mole that starts to demonstrate "uneven borders" should be examined promptly by a board certified dermatologist      Color: Healthy moles tend to be one color throughout  Melanomas tend to be made up of different colors ranging from dark black, blue, white, or red  Any mole that demonstrates a color change should be examined promptly by a board certified dermatologist      Diameter: Healthy moles tend to be smaller than 0 6 cm in size; an exception are "congenital nevi" that can be larger  Melanomas tend to grow and can often be greater than 0 6 cm (1/4 of an inch, or the size of a pencil eraser)  This is only a guideline, and many normal moles may be larger than 0 6 cm without being unhealthy  Any mole that starts to change in size (small to bigger or bigger to smaller) should be examined promptly by a board certified dermatologist      Evolving: Healthy moles tend to "stay the same "  Melanomas may often show signs of change or evolution such as a change in size, shape, color, or elevation  Any mole that starts to itch, bleed, crust, burn, hurt, or ulcerate or demonstrate a change or evolution should be examined promptly by a board certified dermatologist       Dysplastic Nevi  Dysplastic moles are moles that fit the ABCDE rules of melanoma but are not identified as melanomas when examined under the microscope  They may indicate an increased risk of melanoma in that person  If there is a family history of melanoma, most experts agree that the person may be at an increased risk for developing a melanoma  Experts still do not agree on what dysplastic moles mean in patients without a personal or family history of melanoma  Dysplastic moles are usually larger than common moles and have different colors within it with irregular borders  The appearance can be very similar to a melanoma   Biopsies of dysplastic moles may show abnormalities which are different from a regular mole  Melanoma  Malignant melanoma is a type of skin cancer that can be deadly if it spreads throughout the body  The incidence of melanoma in the [place] is growing faster than any other cancer  Melanoma usually grows near the surface of the skin for a period of time, and then begins to grow deeper into the skin  Once it grows deeper into the skin, the risk of spread to other organs greatly increases  Therefore, early detection and removal of a malignant melanoma may result in a better chance at a complete cure; removal after the tumor has spread may not be as effective, leading to worse clinical outcomes such as death  The true rate of nevus transformation into a melanoma is unknown  It has been estimated that the lifetime risk for any acquired melanocytic nevus on any 21year-old individual transforming into melanoma by age [de-identified] is 0 03% (1 in 3,164) for men and 0 009% (1 in 10,800) for women  The appearance of a "new mole" remains one of the most reliable methods for identifying a malignant melanoma  Occasionally, melanomas appear as rapidly growing, blue-black, dome-shaped bumps within a previous mole or previous area of normal skin  Other times, melanomas are suspected when a mole suddenly appears or changes  Itching, burning, or pain in a pigmented lesion should increase suspicion, but most patients with early melanoma have no skin discomfort whatsoever  Melanoma can occur anywhere on the skin, including areas that are difficult for self-examination  Many melanomas are first noticed by other family members  Suspicious-looking moles may be removed for microscopic examination  You may be able to prevent death from melanoma by doing two simple things:    1  Try to avoid unnecessary sun exposure and protect your skin when it is exposed to the sun    People who live near the equator, people who have intermittent exposures to large amounts of sun, and people who have had sunburns in childhood or adolescence have an increased risk for melanoma  Sun sense and vigilant sun protection may be keys to helping to prevent melanoma  We recommend wearing UPF-rated sun protective clothing and sunglasses whenever possible and applying a moisturizer-sunscreen combination product (SPF 50+) such as Neutrogena Daily Defense to sun exposed areas of skin at least three times a day  2  Have your moles regularly examined by a board certified dermatologist AND by yourself or a family member/friend at home  We recommend that you have your moles examined at least once a year by a board certified dermatologist   Use your birthday as an annual reminder to have your "Birthday Suit" (I e , your skin) examined; it is a nice birthday gift to yourself to know that your skin is healthy appearing! Additionally, at-home self examinations may be helpful for detecting a possible melanoma  Use the ABCDEs we discussed and check your moles once a month at home        Scribe Attestation    I,:  Jorge Khan am acting as a scribe while in the presence of the attending physician :       I,:  Zahraa Willingham MD personally performed the services described in this documentation    as scribed in my presence :

## 2022-06-27 ENCOUNTER — OFFICE VISIT (OUTPATIENT)
Dept: PHYSICAL THERAPY | Facility: CLINIC | Age: 37
End: 2022-06-27
Payer: COMMERCIAL

## 2022-06-27 DIAGNOSIS — M62.81 MUSCLE WEAKNESS (GENERALIZED): ICD-10-CM

## 2022-06-27 DIAGNOSIS — G89.29 CHRONIC BILATERAL LOW BACK PAIN WITHOUT SCIATICA: Primary | ICD-10-CM

## 2022-06-27 DIAGNOSIS — M54.50 CHRONIC BILATERAL LOW BACK PAIN WITHOUT SCIATICA: Primary | ICD-10-CM

## 2022-06-27 PROCEDURE — 97110 THERAPEUTIC EXERCISES: CPT | Performed by: PHYSICAL THERAPIST

## 2022-06-27 PROCEDURE — 97140 MANUAL THERAPY 1/> REGIONS: CPT | Performed by: PHYSICAL THERAPIST

## 2022-06-27 PROCEDURE — 97112 NEUROMUSCULAR REEDUCATION: CPT | Performed by: PHYSICAL THERAPIST

## 2022-06-27 NOTE — PROGRESS NOTES
Daily Note     Today's date: 2022  Patient name: Scott Benton  : 1985  MRN: [id number]  Referring provider: Sia Fish PA-C  Dx:   Encounter Diagnosis     ICD-10-CM    1  Chronic bilateral low back pain without sciatica  M54 50     G89 29    2  Muscle weakness (generalized)  M62 81                   Subjective: Patient reports that he is stiff in his low back and has some continued numbness on the front of his shin however overall is improving with less sxs  Objective: See treatment diary below      Assessment: Tolerated treatment well; initiated POC with MH to low back in prolonged extension  VCs provided on proper sequencing with exercises; performed manual stretching for piriformis stretch  Resumed lumbar extension to lateral bend in standing  He reports having slight less numbness but more dull ache  He was instructed to perform lumbar extension frequently throughout the day  Patient demonstrated fatigue post treatment and would benefit from continued PT      Plan: Continue per plan of care        HEP: L lateral side gliding in standing, PERFECTO; PPU    Specialty Daily Treatment Diary       Manual     L sided positional distraction        L reverse [place] jt mob        PA glides lumbar spine SMF SMF SMF SMF    TPR L glute in sidelying SMF in prone SMF in prone SMF in prone and sidelying SMf in prone     Tiger tail lumbar paraspinals        Tiger tail L calf and hamstring        TPR L calf in prone        L LAD in prone   SMF in supine SMF in prone and supine    TPR B lumbar paraspinals                 OP with PPU SMF (5 sec; 2x10) SMF (5 sec; 2x10) SMF (5 sec; 3x10) SMF (5 sec; 3x10)    Guarded L lateral side glide with pushing hips to the right        Low back mobs with BLE press down and then rock to the side for L side        L piriformis stretch   NV SMF (cross body only)            Exercise Diary                 TERT with lumbar extension 10 mins 10 mins 10 mins 10 mins    Prone glute sets 5 sec cx 30 5 sec x 30 5 sec x 30 5 sec x 30    Prone quad stretch c SOS 30 sec x 3 ea 30 sec x 3 ea 30 sec x 3 ea 30 sec x 3 ea                    PERFECTO-PPU 5 sec; 3x10 5 sec; 3x10  5 sec; 3x10 5 sec; 3x10    OP c PPU Blue strap: 5 sec; 3x10 Blue strap: 5 sec; 3x10 Blue strap: 5 sec; 3x10 Bue strap: 5 sec; 3x10    Prone H' Ext         Femoral N glides  10x 2x10 20            Standing lumbar extension  3x10 (against wall, pushing hips forward, and against table)      Standing Doorway L lateral trunk gliding (R arm on doorway with hips dropping to the right)        Standing Lumbar extension combo with L backward lean    2x10     Standing forward trunk flexion off to the right                TAC        TAC c Marching on table        TAC c marching LEs off table        TAC c OH reach with BLE lifted off table                TAC c SLR                TAC c Dying Bug                TAC c LTR -> Lift BLEs off table                        Cross Leg Rotation stretch        Ball Squeezes  10 sec x 20 10 sec x 20 (soccer) 10 sec x 20 (soccer ball)    Ball c bridge        TB Hooklying Clamshells  MTB 10 sec x 30 Purple TB: 10 sec x 30 MTB 10 sec x 20    TB Sidelying Clamshells        TB Bridges                DKTC                Supine Sciatic N Glide  10 ea 10 ea 2x10 (L only)    Supine TFL stretch                 Hand Heel Rocks                Seated or Supine HS stretch   NV Manually NV? Seated or Supine 2 way piriformis stretch   Supine: 20 sec x 3 ea (p! For the L side with increased radiculopathy) Held - NV perform manually?  Manually performed see above    Seated Forward Trunk Flexion using green PB 3 way                Slant board gastroc-soleus stretch        Squats        Lunges                HEP/EDU        Modalities        MH 15 mins in prone with lumbar extension with performing there ex 15 mins in prone with lumbar extension with performing there ex 15 mins in prone with lumbar extension with performing there ex 15 mins in prone with lumbar extension with performing there ex    CP        EStim           Skin checks performed pre and post application: intact

## 2022-06-30 ENCOUNTER — OFFICE VISIT (OUTPATIENT)
Dept: PHYSICAL THERAPY | Facility: CLINIC | Age: 37
End: 2022-06-30
Payer: COMMERCIAL

## 2022-06-30 DIAGNOSIS — G89.29 CHRONIC BILATERAL LOW BACK PAIN WITHOUT SCIATICA: Primary | ICD-10-CM

## 2022-06-30 DIAGNOSIS — M62.81 MUSCLE WEAKNESS (GENERALIZED): ICD-10-CM

## 2022-06-30 DIAGNOSIS — M54.50 CHRONIC BILATERAL LOW BACK PAIN WITHOUT SCIATICA: Primary | ICD-10-CM

## 2022-06-30 PROCEDURE — 97112 NEUROMUSCULAR REEDUCATION: CPT | Performed by: PHYSICAL THERAPIST

## 2022-06-30 PROCEDURE — 97140 MANUAL THERAPY 1/> REGIONS: CPT | Performed by: PHYSICAL THERAPIST

## 2022-06-30 PROCEDURE — 97110 THERAPEUTIC EXERCISES: CPT | Performed by: PHYSICAL THERAPIST

## 2022-06-30 NOTE — PROGRESS NOTES
Daily Note     Today's date: 2022  Patient name: Bart Nguyen  : 1985  MRN: [id number]  Referring provider: Matteo Castaneda PA-C  Dx:   Encounter Diagnosis     ICD-10-CM    1  Chronic bilateral low back pain without sciatica  M54 50     G89 29    2  Muscle weakness (generalized)  M62 81                   Subjective: Patient reports that he continues to have numbness in the front of his shin  He reports that after a warm shower he had less numbness  Objective: See treatment diary below      Assessment: Tolerated treatment well; initiated POC with MH to low back in prolonged table elevation to promote lumbar extension  VCs provided on proper sequencing with exercises; resumed piriformis stretch  He was instructed to increase compliance with exercises daily  He reports having less anterior shin pain and more lateral discomfort and hamstring  Patient demonstrated fatigue post treatment and would benefit from continued PT      Plan: Continue per plan of care        HEP: L lateral side gliding in standing, PERFECTO; PPU    Specialty Daily Treatment Diary       Manual    L sided positional distraction        L reverse [place] jt mob        PA glides lumbar spine SMF SMF SMF SMF SMF   TPR L glute in sidelying SMF in prone SMF in prone SMF in prone and sidelying SMf in prone     Tiger tail lumbar paraspinals        Tiger tail L calf and hamstring        TPR L calf in prone        L LAD in prone   SMF in supine SMF in prone and supine SMF in prone   TPR B lumbar paraspinals                 OP with PPU SMF (5 sec; 2x10) SMF (5 sec; 2x10) SMF (5 sec; 3x10) SMF (5 sec; 3x10) SMF (5 sec; 2x10   Guarded L lateral side glide with pushing hips to the right        Low back mobs with BLE press down and then rock to the side for L side        L piriformis stretch   NV SMF (cross body only)            Exercise Diary                 TERT with lumbar extension 10 mins 10 mins 10 mins  10 mins 10 mins   Prone glute sets 5 sec cx 30 5 sec x 30 5 sec x 30 5 sec x 30 5 sec x 30   Prone quad stretch c SOS 30 sec x 3 ea 30 sec x 3 ea 30 sec x 3 ea 30 sec x 3 ea 30 sec x 3 ea                   PERFECTO-PPU 5 sec; 3x10 5 sec; 3x10  5 sec; 3x10 5 sec; 3x10 5 sec; 3x10   OP c PPU Blue strap: 5 sec; 3x10 Blue strap: 5 sec; 3x10 Blue strap: 5 sec; 3x10 Bue strap: 5 sec; 3x10 Blue strap: 5 sec; 3x10   Prone H' Ext         Femoral N glides  10x 2x10 20 20           Standing lumbar extension  3x10 (against wall, pushing hips forward, and against table)      Standing Doorway L lateral trunk gliding (R arm on doorway with hips dropping to the right)        Standing Lumbar extension combo with L backward lean    2x10     Standing forward trunk flexion off to the right                TAC        TAC c Marching on table        TAC c marching LEs off table        TAC c OH reach with BLE lifted off table                TAC c SLR                TAC c Dying Bug                TAC c LTR -> Lift BLEs off table                        Cross Leg Rotation stretch        Ball Squeezes  10 sec x 20 10 sec x 20 (soccer) 10 sec x 20 (soccer ball) 10 sec x 20 (soccer ball)   Ball c bridge        TB Hooklying Clamshells  MTB 10 sec x 30 Purple TB: 10 sec x 30 MTB 10 sec x 20 MTB 10 sec x 30   TB Sidelying Clamshells        TB Bridges                DKTC                Supine Sciatic N Glide  10 ea 10 ea 2x10 (L only) 2x10 (L only)   Supine TFL stretch                 Hand Heel Rocks                Seated or Supine HS stretch   NV Manually NV? Seated or Supine 2 way piriformis stretch   Supine: 20 sec x 3 ea (p! For the L side with increased radiculopathy) Held - NV perform manually?  Manually performed see above Supine: 20 sec x 3 (cross body pull)   Seated Forward Trunk Flexion using green PB 3 way                Slant board gastroc-soleus stretch        Squats        Lunges                HEP/EDU        Modalities        MH 15 mins in prone with lumbar extension with performing there ex 15 mins in prone with lumbar extension with performing there ex 15 mins in prone with lumbar extension with performing there ex 15 mins in prone with lumbar extension with performing there ex 15 mins in prone with lumbar extension with performing there ex   CP        EStim           Skin checks performed pre and post application: intact

## 2022-07-06 ENCOUNTER — APPOINTMENT (OUTPATIENT)
Dept: PHYSICAL THERAPY | Facility: CLINIC | Age: 37
End: 2022-07-06
Payer: COMMERCIAL

## 2022-07-07 ENCOUNTER — OFFICE VISIT (OUTPATIENT)
Dept: PHYSICAL THERAPY | Facility: CLINIC | Age: 37
End: 2022-07-07
Payer: COMMERCIAL

## 2022-07-07 DIAGNOSIS — G89.29 CHRONIC BILATERAL LOW BACK PAIN WITHOUT SCIATICA: Primary | ICD-10-CM

## 2022-07-07 DIAGNOSIS — M62.81 MUSCLE WEAKNESS (GENERALIZED): ICD-10-CM

## 2022-07-07 DIAGNOSIS — M54.50 CHRONIC BILATERAL LOW BACK PAIN WITHOUT SCIATICA: Primary | ICD-10-CM

## 2022-07-07 PROCEDURE — 97110 THERAPEUTIC EXERCISES: CPT | Performed by: PHYSICAL THERAPIST

## 2022-07-07 PROCEDURE — 97140 MANUAL THERAPY 1/> REGIONS: CPT | Performed by: PHYSICAL THERAPIST

## 2022-07-07 PROCEDURE — 97112 NEUROMUSCULAR REEDUCATION: CPT | Performed by: PHYSICAL THERAPIST

## 2022-07-07 NOTE — PROGRESS NOTES
Daily Note     Today's date: 2022  Patient name: Marilou Arriaga  : 1985  MRN: [id number]  Referring provider: Balaji Bravo PA-C  Dx:   Encounter Diagnosis     ICD-10-CM    1  Chronic bilateral low back pain without sciatica  M54 50     G89 29    2  Muscle weakness (generalized)  M62 81                   Subjective: Patient reports that his discomfort has been improving and noticed that when he stretched out his hip muscle he continues to be very tight  He continues to have some numbness on the anterior shin  He has his MRI this Saturday  Objective: See treatment diary below      Assessment: Tolerated treatment well; initiated POC with MH to low back while in prolonged extension  VCs provided on proper sequencing with exercises; added prone hip extension, bridge with ball, bridge with band, and TAC  He reports some discomfort when he performs TAC c walk outs  Concluded with HS stretch  Instructed pt on performing sciatic nerve glide, hamstring stretch, piriformis stretch, and performing self TPR  Patient demonstrated fatigue post treatment and would benefit from continued PT      Plan: Continue per plan of care        HEP: L lateral side gliding in standing, PERFECTO; PPU    Specialty Daily Treatment Diary       Manual    L sided positional distraction        L reverse [place] jt mob        PA glides lumbar spine SMF   SMF SMF   TPR L glute in sidelying SMF in prone   SMf in prone     Tiger tail lumbar paraspinals        Tiger tail L calf and hamstring SMF L calf and L shin       TPR L calf in prone        L LAD in prone SMF in prone and supine   SMF in prone and supine SMF in prone   TPR B lumbar paraspinals                 OP with PPU SMF (5 sec; 2x10)   SMF (5 sec; 3x10) SMF (5 sec; 2x10   Guarded L lateral side glide with pushing hips to the right        Low back mobs with BLE press down and then rock to the side for L side        L piriformis stretch    SMF (cross body only) Exercise Diary                 TERT with lumbar extension 10 mins    10 mins 10 mins   Prone glute sets 5 sec x 30   5 sec x 30 5 sec x 30   Prone quad stretch c SOS 30 sec x 3 ea   30 sec x 3 ea 30 sec x 3 ea                   PERFECTO-PPU 5 sec; 3x10   5 sec; 3x10 5 sec; 3x10   OP c PPU Blue strap: 5 sec; 2x10   Bue strap: 5 sec; 3x10 Blue strap: 5 sec; 3x10   Prone H' Ext  0# 10 ea       Femoral N glides 30   20 20           Standing lumbar extension        Standing Doorway L lateral trunk gliding (R arm on doorway with hips dropping to the right)        Standing Lumbar extension combo with L backward lean    2x10     Standing forward trunk flexion off to the right                TAC 10 sec x 10       TAC c Marching on table 0# 10 ea       TAC c marching LEs off table        TAC c OH reach with BLE lifted off table                TAC c SLR                TAC c Dying Bug                TAC c LTR -> Lift BLEs off table                        Cross Leg Rotation stretch        Ball Squeezes 10 sec x 20 (soccer ball)   10 sec x 20 (soccer ball) 10 sec x 20 (soccer ball)   Ball c bridge 2 sec x 10       TB Hooklying Clamshells MTB 10 sec x 30   MTB 10 sec x 20 MTB 10 sec x 30   TB Sidelying Clamshells        TB Bridges MTB 2 sec x 10               DKTC                Supine Sciatic N Glide 3x10 (L only)   2x10 (L only) 2x10 (L only)   Supine TFL stretch                 Hand Heel Rocks                Seated or Supine HS stretch Supine: 10 sec x 3 ea   Manually NV?     Seated or Supine 2 way piriformis stretch  Supine: 20 sec x 3 (cross body pull)   Manually performed see above Supine: 20 sec x 3 (cross body pull)   Seated Forward Trunk Flexion using green PB 3 way                Slant board gastroc-soleus stretch        Squats        Lunges                HEP/EDU        Modalities        MH 15 mins in prone with lumbar extension with performing there ex   15 mins in prone with lumbar extension with performing there ex 15 mins in prone with lumbar extension with performing there ex   CP        EStim           Skin checks performed pre and post application: intact

## 2022-07-08 ENCOUNTER — APPOINTMENT (OUTPATIENT)
Dept: PHYSICAL THERAPY | Facility: CLINIC | Age: 37
End: 2022-07-08
Payer: COMMERCIAL

## 2022-07-09 ENCOUNTER — HOSPITAL ENCOUNTER (OUTPATIENT)
Dept: MRI IMAGING | Facility: HOSPITAL | Age: 37
Discharge: HOME | End: 2022-07-09
Attending: ANESTHESIOLOGY
Payer: COMMERCIAL

## 2022-07-09 DIAGNOSIS — M54.16 LEFT LUMBAR RADICULITIS: ICD-10-CM

## 2022-07-09 PROCEDURE — G1004 CDSM NDSC: HCPCS

## 2022-07-09 PROCEDURE — 72148 MRI LUMBAR SPINE W/O DYE: CPT

## 2022-07-11 ENCOUNTER — APPOINTMENT (OUTPATIENT)
Dept: PHYSICAL THERAPY | Facility: CLINIC | Age: 37
End: 2022-07-11
Payer: COMMERCIAL

## 2022-07-11 ENCOUNTER — TELEPHONE (OUTPATIENT)
Dept: PAIN MEDICINE | Facility: CLINIC | Age: 37
End: 2022-07-11

## 2022-07-11 DIAGNOSIS — M51.26 LUMBAR DISC HERNIATION: Primary | ICD-10-CM

## 2022-07-11 DIAGNOSIS — M54.16 LUMBAR RADICULITIS: ICD-10-CM

## 2022-07-11 NOTE — TELEPHONE ENCOUNTER
S/w pt, advised of above  Per pt, L leg pain: L upper glute, outside of L thigh and numbness in his L shin  Explained JUANIS as an injection of steroids into an affected area to reduce swelling and provide relief  This procedure does not provide instant relief  Allow 3-5 days for the steroid to begin to work  During this time, there may be changes in your pain, it may even get worse before it gets better  This office will fu in 7 days to determine the effectiveness of the procedure  Be advised, the procedure may need to be repeated to provide adequate relief  There is also a chance that the procedures will fail to relieve pain  Pt verbalized understanding and agreement  Denied blood thinning medication  Advised pt, anticipate a cb from [address] to schedule as discussed  Please cb if questions / issues arise  Pt verbalized understanding and appreciation

## 2022-07-11 NOTE — TELEPHONE ENCOUNTER
----- Message from Madeleine Boyce DO sent at 7/11/2022  1:44 PM EDT -----  MRI lumbar spine revealed multilevel left-sided disc protrusion  How is his pain?   Is it mostly left-sided, would consider lumbar transforaminal epidural steroid injection x2

## 2022-07-11 NOTE — TELEPHONE ENCOUNTER
Please schedule left L5 and left S1 followed by left L4 left L5 TFESI, diagnosis lumbar disc extrusion, lumbar radiculitis, then follow-up with nurse practitioner

## 2022-07-12 NOTE — TELEPHONE ENCOUNTER
PRE OP INSTRUCTIONS:     -If you are on prescription blood thinners, you may have to hold the medication for several days before the procedure  Please call the office to    discuss medication holds at [phone]   -Do not eat or drink ONE HOUR prior to your procedure  If you are diabetic, may follow regular breakfast/lunch schedule and take usual    diabetic medications   -Lumbar( low back) procedure, please wear comfortable slacks/pants   -Cervical (neck) procedure, please wear a shirt/blouse that is easy to remove   -A  is required to take you home form your procedure   -Continue all to take prescribed medication the day of your procedure, including blood pressure medications   -If you are prescribe antibiotics, have an active infection or have an open wound, please contact the office at [phone]   -Please refrain from any vaccinations two weeks before and two weeks after injection   -Insurance authorization received in not a guarantee of payment per your insurance company's authorization disclaimer and it is    your responsibility to verify your benefits  -If you have any questions about the instructions, please call me at [phone]    Hold medication  x _ full days prior, last dose on _  Patient advised to hold medication from Date till their appointment at that time instructions to restart will be given  Patient stated verbal understanding  Aware that nursing will call her to review hold dates as well

## 2022-07-14 ENCOUNTER — OFFICE VISIT (OUTPATIENT)
Dept: PHYSICAL THERAPY | Facility: CLINIC | Age: 37
End: 2022-07-14
Payer: COMMERCIAL

## 2022-07-14 DIAGNOSIS — G89.29 CHRONIC BILATERAL LOW BACK PAIN WITHOUT SCIATICA: Primary | ICD-10-CM

## 2022-07-14 DIAGNOSIS — M62.81 MUSCLE WEAKNESS (GENERALIZED): ICD-10-CM

## 2022-07-14 DIAGNOSIS — M54.50 CHRONIC BILATERAL LOW BACK PAIN WITHOUT SCIATICA: Primary | ICD-10-CM

## 2022-07-14 PROCEDURE — 97140 MANUAL THERAPY 1/> REGIONS: CPT | Performed by: PHYSICAL THERAPIST

## 2022-07-14 PROCEDURE — 97112 NEUROMUSCULAR REEDUCATION: CPT | Performed by: PHYSICAL THERAPIST

## 2022-07-14 PROCEDURE — 97110 THERAPEUTIC EXERCISES: CPT | Performed by: PHYSICAL THERAPIST

## 2022-07-14 NOTE — PROGRESS NOTES
Daily Note     Today's date: 2022  Patient name: Jayce Nguyen  : 1985  MRN: [id number]  Referring provider: Omega Kelley PA-C  Dx:   Encounter Diagnosis     ICD-10-CM    1  Chronic bilateral low back pain without sciatica  M54 50     G89 29    2  Muscle weakness (generalized)  M62 81                   Subjective: Patient reports that has continues numbness  He is scheduled for an injection   Objective: See treatment diary below      Assessment: Tolerated treatment well; initiated POC with MH to low back while performing there ex  VCs provided on proper sequencing with exercises; time constraint todays  He denies having increased pain throughout session  He was instructed on HEP to perform while away on vacation  Patient demonstrated fatigue post treatment and would benefit from continued PT      Plan: Continue per plan of care        HEP: L lateral side gliding in standing, PERFECTO; PPU    Specialty Daily Treatment Diary       Manual    L sided positional distraction        L reverse [place] jt mob        PA glides lumbar spine SMF SMF   SMF   TPR L glute in sidelying SMF in prone SMF in prone      Tiger tail lumbar paraspinals        Tiger tail L calf and hamstring SMF L calf and L shin SMF L calf      TPR L calf in prone        L LAD in prone SMF in prone and supine SMF in prone   SMF in prone   TPR B lumbar paraspinals                 OP with PPU SMF (5 sec; 2x10) SMF (5 sec; 2x10)   SMF (5 sec; 2x10   Guarded L lateral side glide with pushing hips to the right        Low back mobs with BLE press down and then rock to the side for L side        L piriformis stretch                Exercise Diary                 TERT with lumbar extension 10 mins  5 mins   10 mins   Prone glute sets 5 sec x 30 5 sec x 30   5 sec x 30   Prone quad stretch c SOS 30 sec x 3 ea 30 sec x 3 ea   30 sec x 3 ea                   PERFECTO-PPU 5 sec; 3x10 5 sec; 3x10   5 sec; 3x10   OP c PPU Blue strap: 5 sec; 2x10 Blue strap: 5 sec; 2x10   Blue strap: 5 sec; 3x10   Prone H' Ext  0# 10 ea 0# 10 ea      Femoral N glides 30 30   20           Standing lumbar extension        Standing Doorway L lateral trunk gliding (R arm on doorway with hips dropping to the right)        Standing Lumbar extension combo with L backward lean        Standing forward trunk flexion off to the right                TAC 10 sec x 10 NV      TAC c Marching on table 0# 10 ea NV      TAC c marching LEs off table        TAC c OH reach with BLE lifted off table                TAC c SLR                TAC c Dying Bug                TAC c LTR -> Lift BLEs off table                        Cross Leg Rotation stretch        Ball Squeezes 10 sec x 20 (soccer ball) 10 sec x 20 (soccer ball)   10 sec x 20 (soccer ball)   Ball c bridge 2 sec x 10 2 sec x 10      TB Hooklying Clamshells MTB 10 sec x 30 MTB 10 sec x 20   MTB 10 sec x 30   TB Sidelying Clamshells        TB Bridges MTB 2 sec x 10               DKTC                Supine Sciatic N Glide 3x10 (L only) 2x10 (L only)   2x10 (L only)   Supine TFL stretch                 Hand Heel [place]                Seated or Supine HS stretch Supine: 10 sec x 3 ea       Seated or Supine 2 way piriformis stretch  Supine: 20 sec x 3 (cross body pull) Supine: 20 sec x 3 (cross body pull)   Supine: 20 sec x 3 (cross body pull)   Seated Forward Trunk Flexion using green PB 3 way                Slant board gastroc-soleus stretch        Squats        Lunges                HEP/EDU        Modalities        MH 15 mins in prone with lumbar extension with performing there ex 10 mins in prone with lumbar extension with performing there ex   15 mins in prone with lumbar extension with performing there ex   CP        EStim           Skin checks performed pre and post application: intact

## 2022-07-18 ENCOUNTER — APPOINTMENT (OUTPATIENT)
Dept: PHYSICAL THERAPY | Facility: CLINIC | Age: 37
End: 2022-07-18
Payer: COMMERCIAL

## 2022-07-21 ENCOUNTER — APPOINTMENT (OUTPATIENT)
Dept: PHYSICAL THERAPY | Facility: CLINIC | Age: 37
End: 2022-07-21
Payer: COMMERCIAL

## 2022-07-25 ENCOUNTER — APPOINTMENT (OUTPATIENT)
Dept: PHYSICAL THERAPY | Facility: CLINIC | Age: 37
End: 2022-07-25
Payer: COMMERCIAL

## 2022-07-28 ENCOUNTER — APPOINTMENT (OUTPATIENT)
Dept: PHYSICAL THERAPY | Facility: CLINIC | Age: 37
End: 2022-07-28
Payer: COMMERCIAL

## 2022-08-01 ENCOUNTER — APPOINTMENT (OUTPATIENT)
Dept: PHYSICAL THERAPY | Facility: CLINIC | Age: 37
End: 2022-08-01
Payer: COMMERCIAL

## 2022-08-04 ENCOUNTER — OFFICE VISIT (OUTPATIENT)
Dept: PHYSICAL THERAPY | Facility: CLINIC | Age: 37
End: 2022-08-04
Payer: COMMERCIAL

## 2022-08-04 DIAGNOSIS — M62.81 MUSCLE WEAKNESS (GENERALIZED): ICD-10-CM

## 2022-08-04 DIAGNOSIS — M54.50 CHRONIC BILATERAL LOW BACK PAIN WITHOUT SCIATICA: Primary | ICD-10-CM

## 2022-08-04 DIAGNOSIS — G89.29 CHRONIC BILATERAL LOW BACK PAIN WITHOUT SCIATICA: Primary | ICD-10-CM

## 2022-08-04 PROCEDURE — 97110 THERAPEUTIC EXERCISES: CPT | Performed by: PHYSICAL THERAPIST

## 2022-08-04 PROCEDURE — 97140 MANUAL THERAPY 1/> REGIONS: CPT | Performed by: PHYSICAL THERAPIST

## 2022-08-04 PROCEDURE — 97112 NEUROMUSCULAR REEDUCATION: CPT | Performed by: PHYSICAL THERAPIST

## 2022-08-04 NOTE — PROGRESS NOTES
Daily Note     Today's date: 2022  Patient name: Marilou Arriaga  : 1985  MRN: [id number]  Referring provider: Balaji Bravo PA-C  Dx:   Encounter Diagnosis     ICD-10-CM    1  Chronic bilateral low back pain without sciatica  M54 50     G89 29    2  Muscle weakness (generalized)  M62 81                   Subjective: Patient reports that he has no numbness in his leg however he does have some low back discomfort as well as L lateral lower leg  Objective: See treatment diary below      Assessment: Tolerated treatment well; initiated POC with MH with lumbar extension in prone  VCs provided on proper sequencing with exercises; initiated DKTC for spinal flexion f/b LE stretching  He reports that he had relief with stretching and was able to perform TAC without onset of low back pain  Patient demonstrated fatigue post treatment and would benefit from continued PT      Plan: Continue per plan of care        HEP: L lateral side gliding in standing, PERFECTO; PPU    Specialty Daily Treatment Diary       Manual    L sided positional distraction        L reverse [place] jt mob        PA glides lumbar spine SMF SMF SMF  SMF   TPR L glute in sidelying SMF in prone SMF in prone      Tiger tail lumbar paraspinals        Tiger tail L calf and hamstring SMF L calf and L shin SMF L calf SMF L calf     TPR L calf in prone        L LAD in prone SMF in prone and supine SMF in prone SMF in prone  SMF in prone   TPR B lumbar paraspinals                 OP with PPU SMF (5 sec; 2x10) SMF (5 sec; 2x10) SMF (5 sec; 2x10)  SMF (5 sec; 2x10   Guarded L lateral side glide with pushing hips to the right        Low back mobs with BLE press down and then rock to the side for L side        L piriformis stretch                Exercise Diary                 TERT with lumbar extension 10 mins  5 mins 5 mins  10 mins   Prone glute sets 5 sec x 30 5 sec x 30 5 sec x 30  5 sec x 30   Prone quad stretch c SOS 30 sec x 3 ea 30 sec x 3 ea 30 sec x 3 ea  30 sec x 3 ea                   PERFECTO-PPU 5 sec; 3x10 5 sec; 3x10 5 sec; 3x10  5 sec; 3x10   OP c PPU Blue strap: 5 sec; 2x10 Blue strap: 5 sec; 2x10 NV?   Blue strap: 5 sec; 3x10   Prone H' Ext  0# 10 ea 0# 10 ea 0# 10 ea     Femoral N glides 30 30 30  20           Standing lumbar extension        Standing Doorway L lateral trunk gliding (R arm on doorway with hips dropping to the right)        Standing Lumbar extension combo with L backward lean        Standing forward trunk flexion off to the right                TAC 10 sec x 10 NV 10 sec x 10      TAC c Marching on table 0# 10 ea NV      TAC c marching LEs off table        TAC c OH reach with BLE lifted off table                TAC c SLR                TAC c Dying Bug                TAC c LTR -> Lift BLEs off table                        Cross Leg Rotation stretch        Ball Squeezes 10 sec x 20 (soccer ball) 10 sec x 20 (soccer ball) 10 sec x 20 (soccer ball)  10 sec x 20 (soccer ball)   Ball c bridge 2 sec x 10 2 sec x 10      TB Hooklying Clamshells MTB 10 sec x 30 MTB 10 sec x 20 MTB 10 sec x 20  MTB 10 sec x 30   TB Sidelying Clamshells        TB Bridges MTB 2 sec x 10  MTB 5 sec x 10             DKTC   5 sec x 10             Supine Sciatic N Glide 3x10 (L only) 2x10 (L only) 2x10 (L only)  2x10 (L only)   Supine TFL stretch                 Hand [place]el [place]                Seated or Supine HS stretch Supine: 10 sec x 3 ea  Supine: 20 sec x 3 ea     Seated or Supine 2 way piriformis stretch  Supine: 20 sec x 3 (cross body pull) Supine: 20 sec x 3 (cross body pull) Supine: 20 sec x 3 (cross body pull)  Supine: 20 sec x 3 (cross body pull)   Seated Forward Trunk Flexion using green PB 3 way                Slant board gastroc-soleus stretch   30 sec x 3 (gastroc only)      Squats        Lunges                HEP/EDU        Modalities        MH 15 mins in prone with lumbar extension with performing there ex 10 mins in prone with lumbar extension with performing there ex 10 mins in prone with lumbar extension with performing there ex  15 mins in prone with lumbar extension with performing there ex   CP        EStim           Skin checks performed pre and post application: intact

## 2022-08-05 ENCOUNTER — HOSPITAL ENCOUNTER (OUTPATIENT)
Dept: RADIOLOGY | Facility: CLINIC | Age: 37
Discharge: HOME | End: 2022-08-05
Admitting: ANESTHESIOLOGY
Payer: COMMERCIAL

## 2022-08-05 VITALS
HEART RATE: 65 BPM | TEMPERATURE: 97.7 F | SYSTOLIC BLOOD PRESSURE: 150 MMHG | DIASTOLIC BLOOD PRESSURE: 87 MMHG | OXYGEN SATURATION: 95 % | RESPIRATION RATE: 20 BRPM

## 2022-08-05 DIAGNOSIS — M51.26 LUMBAR DISC HERNIATION: ICD-10-CM

## 2022-08-05 DIAGNOSIS — M54.16 LUMBAR RADICULITIS: ICD-10-CM

## 2022-08-05 PROCEDURE — 64483 NJX AA&/STRD TFRM EPI L/S 1: CPT | Performed by: ANESTHESIOLOGY

## 2022-08-05 PROCEDURE — 64484 NJX AA&/STRD TFRM EPI L/S EA: CPT | Performed by: ANESTHESIOLOGY

## 2022-08-05 RX ORDER — METHYLPREDNISOLONE ACETATE 80 MG/ML
160 INJECTION, SUSPENSION INTRA-ARTICULAR; INTRALESIONAL; INTRAMUSCULAR; PARENTERAL; SOFT TISSUE ONCE
Status: COMPLETED | OUTPATIENT
Start: 2022-08-05 | End: 2022-08-05

## 2022-08-05 RX ADMIN — IOHEXOL 1 ML: 300 INJECTION, SOLUTION INTRAVENOUS at 15:29

## 2022-08-05 RX ADMIN — METHYLPREDNISOLONE ACETATE 160 MG: 80 INJECTION, SUSPENSION INTRA-ARTICULAR; INTRALESIONAL; INTRAMUSCULAR; SOFT TISSUE at 15:29

## 2022-08-05 NOTE — H&P
History of Present Illness: The patient is a 39 y o  male who presents with complaints of low back and leg pain  Patient Active Problem List   Diagnosis    Lumbar disc herniation    Lumbar radiculitis       No past medical history on file  No past surgical history on file  Current Outpatient Medications:     cyclobenzaprine (FLEXERIL) 10 mg tablet, Take 1 tablet (10 mg total) by mouth 3 (three) times a day as needed for muscle spasms, Disp: 60 tablet, Rfl: 3    methylprednisolone (MEDROL) 4 mg tablet, Medrol dose pack:  Take as directed  (Patient not taking: Reported on 6/22/2022), Disp: 21 tablet, Rfl: 0    naproxen (EC NAPROSYN) 500 MG EC tablet, Take 1 tablet (500 mg total) by mouth 2 (two) times a day with meals, Disp: 60 tablet, Rfl: 3    triamcinolone (KENALOG) 0 1 % cream, 2 times daily prn flares of poison ivy for 2-3 weeks then take 1 week break; repeat prn (Patient not taking: No sig reported), Disp: 30 g, Rfl: 3    Current Facility-Administered Medications:     iohexol (OMNIPAQUE) 300 mg/mL injection 50 mL, 50 mL, Epidural, Once, Ruslan Otero,     methylPREDNISolone acetate (DEPO-MEDROL) injection 160 mg, 160 mg, Epidural, Once, Ruslan Otero,     No Known Allergies    Physical Exam:   Vitals:    08/05/22 1513   BP: 144/88   Pulse: 74   Resp: 20   Temp: 97 7 °F (36 5 °C)   SpO2: 97%     General: Awake, Alert, Oriented x 3, Mood and affect appropriate  Respiratory: Respirations even and unlabored  Cardiovascular: Peripheral pulses intact; no edema  Musculoskeletal Exam:  Decreased range of motion lumbar spine    ASA Score: I    Patient/Chart Verification  Patient ID Verified: Verbal  ID Band Applied: No  Consents Confirmed: Procedural, To be obtained in the Pre-Procedure area  Interval H&P(within 24 hr) Complete (required for Outpatients and Surgery Admit only): To be obtained in the Pre-Procedure area  Allergies Reviewed:  Yes  Anticoag/NSAID held?: NA  Currently on antibiotics?: No    Assessment:   1  Lumbar radiculitis    2   Lumbar disc herniation        Plan: left L5 and left S1

## 2022-08-05 NOTE — DISCHARGE INSTRUCTIONS
Epidural Steroid Injection   WHAT YOU NEED TO KNOW:   An epidural steroid injection (JUANIS) is a procedure to inject steroid medicine into the epidural space  The epidural space is between your spinal cord and vertebrae  Steroids reduce inflammation and fluid buildup in your spine that may be causing pain  You may be given pain medicine along with the steroids  ACTIVITY  Do not drive or operate machinery today  No strenuous activity today - bending, lifting, etc   You may resume normal activites starting tomorrow - start slowly and as tolerated  You may shower today, but no tub baths or hot tubs  You may have numbness for several hours from the local anesthetic  Please use caution and common sense, especially with weight-bearing activities  CARE OF THE INJECTION SITE  If you have soreness or pain, apply ice to the area today (20 minutes on/20 minutes off)  Starting tomorrow, you may use warm, moist heat or ice if needed  You may have an increase or change in your discomfort for 36-48 hours after your treatment  Apply ice and continue with any pain medication you have been prescribed  Notify the [facility] if you have any of the following: redness, drainage, swelling, headache, stiff neck or fever above 100°F     SPECIAL INSTRUCTIONS  Our office will contact you in approximately 7 days for a progress report  MEDICATIONS  Continue to take all routine medications  Our office may have instructed you to hold some medications  As no general anesthesia was used in today's procedure, you should not experience any side effects related to anesthesia  If you are diabetic, the steroids used in today's injection may temporarily increase your blood sugar levels after the first few days after your injection  Please keep a close eye on your sugars and alert the doctor who manages your diabetes if your sugars are significantly high from your baseline or you are symptomatic       If you have a problem specifically related to your procedure, please call our office at [phone]  Problems not related to your procedure should be directed to your primary care physician

## 2022-08-08 ENCOUNTER — APPOINTMENT (OUTPATIENT)
Dept: PHYSICAL THERAPY | Facility: CLINIC | Age: 37
End: 2022-08-08
Payer: COMMERCIAL

## 2022-08-11 ENCOUNTER — APPOINTMENT (OUTPATIENT)
Dept: PHYSICAL THERAPY | Facility: CLINIC | Age: 37
End: 2022-08-11
Payer: COMMERCIAL

## 2022-08-12 ENCOUNTER — TELEPHONE (OUTPATIENT)
Dept: PAIN MEDICINE | Facility: CLINIC | Age: 37
End: 2022-08-12

## 2022-08-15 ENCOUNTER — APPOINTMENT (OUTPATIENT)
Dept: PHYSICAL THERAPY | Facility: CLINIC | Age: 37
End: 2022-08-15
Payer: COMMERCIAL

## 2022-08-16 ENCOUNTER — EVALUATION (OUTPATIENT)
Dept: PHYSICAL THERAPY | Facility: CLINIC | Age: 37
End: 2022-08-16
Payer: COMMERCIAL

## 2022-08-16 DIAGNOSIS — M54.50 CHRONIC BILATERAL LOW BACK PAIN WITHOUT SCIATICA: Primary | ICD-10-CM

## 2022-08-16 DIAGNOSIS — M62.81 MUSCLE WEAKNESS (GENERALIZED): ICD-10-CM

## 2022-08-16 DIAGNOSIS — G89.29 CHRONIC BILATERAL LOW BACK PAIN WITHOUT SCIATICA: Primary | ICD-10-CM

## 2022-08-16 PROCEDURE — 97112 NEUROMUSCULAR REEDUCATION: CPT | Performed by: PHYSICAL THERAPIST

## 2022-08-16 PROCEDURE — 97110 THERAPEUTIC EXERCISES: CPT | Performed by: PHYSICAL THERAPIST

## 2022-08-16 NOTE — PROGRESS NOTES
PT Evaluation     Today's date: 2022  Patient name: Erman Fleischer  : 1985  MRN: [id number]  Referring provider: Naomi Murray PA-C  Dx:   Encounter Diagnosis     ICD-10-CM    1  Chronic bilateral low back pain without sciatica  M54 50     G89 29    2  Muscle weakness (generalized)  M62 81                   Assessment  Assessment details: Erman Fleischer is a 39 y o  male presenting to outpatient physical therapy at [facility] with complaints of chronic progressive L sided LBP with insidious onset approximately three years ago  He experienced an exacerbation of sxs several weeks ago when he was lifting  He reports having minimal LLE radiculopathy since his recent injection approximately one week ago  Since his recent injection, he reports having less LLE radiculopathy and improved tolerance to functional mobility  He demonstrates improved trunk ROM, core strength, and BLE strength; however, he continues to have decreased range of motion, decreased strength, limited flexibility, poor postural awareness, poor body mechanics, poor balance, decreased tolerance to activity and decreased functional mobility due to Chronic bilateral low back pain without sciatica  (primary encounter diagnosis), Muscle weakness (generalized)  Therapist discussed diagnosis, prognosis, plan of care, proper responses to exercises, HEP, and modalities to use at home   [facility] would benefit from skilled PT services in order to address these deficits and reach maximum level of function   Thank you for the referral!  Impairments: abnormal coordination, abnormal gait, abnormal muscle firing, abnormal muscle tone, abnormal or restricted ROM, abnormal movement, activity intolerance, impaired physical strength, lacks appropriate home exercise program, pain with function, poor posture  and poor body mechanics    Symptom irritability: moderateUnderstanding of Dx/Px/POC: good   Prognosis: good    Goals  STGs (4 weeks):  1   Pt will report having at least a 50% improvement since I E  - Goal met  2  Pt will report having at most a 2/10 pain level with functional mobility  - Progressing towards  3  Pt will demonstrate good posture with prolonged positions as well as transitional movements especially from sitting to standing  - Progressing towards    LTGs (in 12 weeks):  1  Pt will report having at least a 75% improvement since I E  - Progressing towards  2  Pt will demonstrate good lifting mechanics for ADLs and activities  - Progressing towards   3  Pt will report having minimal AM pain when he gets OOB  - Progressing towards  4  Pt will be independent with HEP  - Progressing towards  5  Pt will deny having pain when he bends forward such as donning/doffing socks and shoes  - Progressing towards    Plan  Patient would benefit from: skilled physical therapy  Planned modality interventions: TENS and unattended electrical stimulation  Planned therapy interventions: joint mobilization, manual therapy, neuromuscular re-education, balance, patient education, self care, strengthening, stretching, therapeutic activities, therapeutic exercise, home exercise program, gait training and flexibility  Frequency: 2x week  Plan of Care beginning date: 8/16/2022  Plan of Care expiration date: 11/8/2022  Treatment plan discussed with: patient        Subjective Evaluation    History of Present Illness    RE: 8/16/2022: Patient reports that he had his injection approximately one week ago and has had relief down his leg  He reports having minimal discomfort  He was doing yard work and reports having no pain  RE 5/16/2022: Pt reports that he continues to have lateral leg pain when he squeezes in his stomach and activates his glutes  Mechanism of injury: Pt is a 39year old male who presents with chronic progressive L sided LBP with insidious onset approximately three years ago   He reports that he has noted shifting to the right side when sitting and painful transitions however it used to work itself out  Recently he had an exacerbation with sxs with sxs not improving  No current imaging  Quality of life: good    Pain  Current pain ratin  At best pain ratin  At worst pain ratin  Pain location: L sided low back; L SI region  Quality: tight, sharp, dull ache, discomfort, pressure and pulling  Relieving factors: relaxation and rest  Aggravating factors: standing and walking (prolonged sitting with increased sxs with transition to standing; lifting; bending over; donning/doffing socks and shoes)  Progression: gradually improving    Patient Goals  Patient goals for therapy: decreased pain, increased motion, increased strength, independence with ADLs/IADLs, return to sport/leisure activities and return to work          Objective     Posture: Lumbar lordosis is increased in standing  There is right lateral shift  In siting, lumbar roll improves comfort      Lumbar AROM limitations:  (*=  Pain)  Lumbar flexion: 75%*  Lumbar extension: 75%  R side glide:  50%*  L side glide:  50%*    Mechanical Asessment: pre-test symtpoms include L sided low back pain near SI jt region  Repeated Extension in Standing (MARIANO): Slight improvement  Repeated Extension in Lying (REIL):  Slight improvement    Strength:  Core strength: Upper abs: 4-/5; lower abs: 3-/5     Right  Left  Hip flexion:  4/5  4/5  Knee ext  4/5  4/5  Ankle DF  4+/5  4+/5  Ankle PF  4/5  4/5  Knee flex  4/5  4/5      Hip abduction  4/5  4/5  Hip adduction  4-/5  4-/5  Hip extension  3+/5  3+/5    Joint mobility: decreased PA glides in lumbar spine    Tenderness/Palpation: no TPR    Sensation: intact light touch throughout BLEs     Flexibility: decreased HS; hip flexor; quadriceps, and piriformis (L worse than R)    Function: decreased trunk flexion with squatting movements with chair mobility        Precautions: standard    HEP: L lateral side gliding in standing, PERFECTO; PPU    Specialty Daily Treatment Diary Manual 7/7 7/14 8/4 8/16    L sided positional distraction        L reverse [place] jt mob        PA glides lumbar spine SMF SMF SMF     TPR L glute in sidelying SMF in prone SMF in prone      Tiger tail lumbar paraspinals        Tiger tail L calf and hamstring SMF L calf and L shin SMF L calf SMF L calf SMF L calf    TPR L calf in prone        L LAD in prone SMF in prone and supine SMF in prone SMF in prone     TPR B lumbar paraspinals                 OP with PPU SMF (5 sec; 2x10) SMF (5 sec; 2x10) SMF (5 sec; 2x10)     Guarded L lateral side glide with pushing hips to the right        Low back mobs with BLE press down and then rock to the side for L side        L piriformis stretch                Exercise Diary                 TERT with lumbar extension 10 mins  5 mins 5 mins 5 mins    Prone glute sets 5 sec x 30 5 sec x 30 5 sec x 30 5 sec x 30    Prone quad stretch c SOS 30 sec x 3 ea 30 sec x 3 ea 30 sec x 3 ea 30 sec x 3 ea                    PERFECTO-PPU 5 sec; 3x10 5 sec; 3x10 5 sec; 3x10 5 sec; 20    OP c PPU Blue strap: 5 sec; 2x10 Blue strap: 5 sec; 2x10 NV?      Prone H' Ext  0# 10 ea 0# 10 ea 0# 10 ea 0# 2x10 ea    Femoral N glides 30 30 30             Standing lumbar extension        Standing Doorway L lateral trunk gliding (R arm on doorway with hips dropping to the right)        Standing Lumbar extension combo with L backward lean        Standing forward trunk flexion off to the right                TAC 10 sec x 10 NV 10 sec x 10      TAC c Marching on table 0# 10 ea NV      TAC c marching LEs off table        TAC c OH reach with BLE lifted off table    5# 15 (feet on table); 5# 15 (feet off table)            TAC c SLR    0# 2x15 ea            TAC c Dying Bug    NV            TAC c LTR -> Lift BLEs off table    2 sec x 10                    Cross Leg Rotation stretch        Ball Squeezes 10 sec x 20 (soccer ball) 10 sec x 20 (soccer ball) 10 sec x 20 (soccer ball) 10 sec x 20 (soccer ball)    Princess kruger bridge 2 sec x 10 2 sec x 10  5 sec x 20 (soccer ball)    TB Hooklying Clamshells MTB 10 sec x 30 MTB 10 sec x 20 MTB 10 sec x 20     TB Sidelying Clamshells    MTB 5 sec x 20    TB Bridges MTB 2 sec x 10  MTB 5 sec x 10 MTB 5 sec x 20            DKTC   5 sec x 10             Supine Sciatic N Glide 3x10 (L only) 2x10 (L only) 2x10 (L only)     Supine TFL stretch                 Hand Heel Rocks                Quadruped Alt UE c TAC    NV    TAC Quadruped Alt LE    NV    TAC Quadruped Alt UE and LE    NV                            Seated or Supine HS stretch Supine: 10 sec x 3 ea  Supine: 20 sec x 3 ea Supine: 30 sec x 3 ea    Seated or Supine 2 way piriformis stretch  Supine: 20 sec x 3 (cross body pull) Supine: 20 sec x 3 (cross body pull) Supine: 20 sec x 3 (cross body pull) Supine: 30 sec x 3 ea    Seated Forward Trunk Flexion using green PB 3 way                Slant board gastroc-soleus stretch   30 sec x 3 (gastroc only)                      Squats (goblet)    NV            Pallof Press    NV    Pallof Press with trunk rotation                Lunges                HEP/EDU        Modalities        MH 15 mins in prone with lumbar extension with performing there ex 10 mins in prone with lumbar extension with performing there ex 10 mins in prone with lumbar extension with performing there ex 5 mins in prone with lumbar extension with performing there ex    CP        EStim           Skin checks performed pre and post application: intact

## 2022-08-18 ENCOUNTER — APPOINTMENT (OUTPATIENT)
Dept: PHYSICAL THERAPY | Facility: CLINIC | Age: 37
End: 2022-08-18
Payer: COMMERCIAL

## 2022-08-22 ENCOUNTER — OFFICE VISIT (OUTPATIENT)
Dept: PHYSICAL THERAPY | Facility: CLINIC | Age: 37
End: 2022-08-22
Payer: COMMERCIAL

## 2022-08-22 DIAGNOSIS — G89.29 CHRONIC BILATERAL LOW BACK PAIN WITHOUT SCIATICA: Primary | ICD-10-CM

## 2022-08-22 DIAGNOSIS — M54.50 CHRONIC BILATERAL LOW BACK PAIN WITHOUT SCIATICA: Primary | ICD-10-CM

## 2022-08-22 DIAGNOSIS — M62.81 MUSCLE WEAKNESS (GENERALIZED): ICD-10-CM

## 2022-08-22 PROCEDURE — 97112 NEUROMUSCULAR REEDUCATION: CPT | Performed by: PHYSICAL THERAPIST

## 2022-08-22 PROCEDURE — 97110 THERAPEUTIC EXERCISES: CPT | Performed by: PHYSICAL THERAPIST

## 2022-08-22 NOTE — PROGRESS NOTES
Daily Note     Today's date: 2022  Patient name: Herminia Ahumada  : 1985  MRN: [id number]  Referring provider: Ameya Mello PA-C  Dx:   Encounter Diagnosis     ICD-10-CM    1  Chronic bilateral low back pain without sciatica  M54 50     G89 29    2  Muscle weakness (generalized)  M62 81                   Subjective: Patient reports that he continues to feel good  Objective: See treatment diary below      Assessment: Tolerated treatment well; initiated POC with MH to lumbar spine in prone extension with glute sets and prone quad stretch  VCs provided on proper sequencing with exercises; he has some discomfort with his lateral lower calf during core activation exercises  Added quadruped strengthening, standing goblet squats, and pallof press  He was instructed to continue to perform lumbar extension stretching and LE stretching at home  Patient demonstrated fatigue post treatment and would benefit from continued PT      Plan: Continue per plan of care        HEP: L lateral side gliding in standing, PERFECTO; PPU      Specialty Daily Treatment Diary       Manual    L sided positional distraction        L reverse [place] jt mob        PA glides lumbar spine SMF SMF SMF     TPR L glute in sidelying SMF in prone SMF in prone      Tiger tail lumbar paraspinals        Tiger tail L calf and hamstring SMF L calf and L shin SMF L calf SMF L calf SMF L calf    TPR L calf in prone        L LAD in prone SMF in prone and supine SMF in prone SMF in prone     TPR B lumbar paraspinals                 OP with PPU SMF (5 sec; 2x10) SMF (5 sec; 2x10) SMF (5 sec; 2x10)     Guarded L lateral side glide with pushing hips to the right        Low back mobs with BLE press down and then rock to the side for L side        L piriformis stretch                Exercise Diary                 TERT with lumbar extension 10 mins  5 mins 5 mins 5 mins 5 mins   Prone glute sets 5 sec x 30 5 sec x 30 5 sec x 30 5 sec x 30 5 sec x 30   Prone quad stretch c SOS 30 sec x 3 ea 30 sec x 3 ea 30 sec x 3 ea 30 sec x 3 ea 30 sec x 3 ea                   PERFECTO-PPU 5 sec; 3x10 5 sec; 3x10 5 sec; 3x10 5 sec; 20 5 sec x 20   OP c PPU Blue strap: 5 sec; 2x10 Blue strap: 5 sec; 2x10 NV?      Prone H' Ext  0# 10 ea 0# 10 ea 0# 10 ea 0# 2x10 ea 0# 3x10 ea   Femoral N glides 30 30 30             Standing lumbar extension        Standing Doorway L lateral trunk gliding (R arm on doorway with hips dropping to the right)        Standing Lumbar extension combo with L backward lean        Standing forward trunk flexion off to the right                TAC 10 sec x 10 NV 10 sec x 10      TAC c Marching on table 0# 10 ea NV      TAC c marching LEs off table        TAC c OH reach with BLE lifted off table    5# 15 (feet on table); 5# 15 (feet off table) 5# 3x10 (feet off table)           TAC c SLR    0# 2x15 ea 0# 2x15 ea           TAC c Dying Bug    NV 0# 15           TAC c LTR -> Lift BLEs off table    2 sec x 10 2 sec; 2x10                    Cross Leg Rotation stretch        Ball Squeezes 10 sec x 20 (soccer ball) 10 sec x 20 (soccer ball) 10 sec x 20 (soccer ball) 10 sec x 20 (soccer ball) 10 sec x 20 (soccer ball)   Ball c bridge 2 sec x 10 2 sec x 10  5 sec x 20 (soccer ball) 5 sec x 20 (soccer ball)   TB Hooklying Clamshells MTB 10 sec x 30 MTB 10 sec x 20 MTB 10 sec x 20  DC   TB Sidelying Clamshells    MTB 5 sec x 20 MTB 5 sec x 30   TB Bridges MTB 2 sec x 10  MTB 5 sec x 10 MTB 5 sec x 20 MTB 5 sec x 20           DKTC   5 sec x 10             Supine Sciatic N Glide 3x10 (L only) 2x10 (L only) 2x10 (L only)  2 sec x 10 (L only)   Supine TFL stretch                 Hand Heel [place]ocks                Quadruped Alt UE c TAC    NV 0# 10 ea   TAC Quadruped Alt LE    NV 0# 15 ea   TAC Quadruped Alt UE and LE    NV                            Seated or Supine HS stretch Supine: 10 sec x 3 ea  Supine: 20 sec x 3 ea Supine: 30 sec x 3 ea Supine: 30 sec x 3 ea   Seated or Supine 2 way piriformis stretch  Supine: 20 sec x 3 (cross body pull) Supine: 20 sec x 3 (cross body pull) Supine: 20 sec x 3 (cross body pull) Supine: 30 sec x 3 ea Supine: 30 sec x 3 ea   Seated Forward Trunk Flexion using green PB 3 way                Slant board gastroc-soleus stretch   30 sec x 3 (gastroc only)   30 sec x 3 (gastroc only)                    Squats (goblet)    NV 10# 3x10           Pallof Press    NV OTB 5 sec x 20 ea   Pallof Press with walk out        Globoforce with trunk rotation                Lunges                HEP/EDU        Modalities        MH 15 mins in prone with lumbar extension with performing there ex 10 mins in prone with lumbar extension with performing there ex 10 mins in prone with lumbar extension with performing there ex 5 mins in prone with lumbar extension with performing there ex 5 mins in prone with lumbar extension with performing there ex   CP        EStim           Skin checks performed pre and post application: intact

## 2022-08-25 ENCOUNTER — APPOINTMENT (OUTPATIENT)
Dept: PHYSICAL THERAPY | Facility: CLINIC | Age: 37
End: 2022-08-25
Payer: COMMERCIAL

## 2022-08-29 ENCOUNTER — APPOINTMENT (OUTPATIENT)
Dept: PHYSICAL THERAPY | Facility: CLINIC | Age: 37
End: 2022-08-29
Payer: COMMERCIAL

## 2022-09-01 ENCOUNTER — OFFICE VISIT (OUTPATIENT)
Dept: PHYSICAL THERAPY | Facility: CLINIC | Age: 37
End: 2022-09-01
Payer: COMMERCIAL

## 2022-09-01 DIAGNOSIS — G89.29 CHRONIC BILATERAL LOW BACK PAIN WITHOUT SCIATICA: Primary | ICD-10-CM

## 2022-09-01 DIAGNOSIS — M62.81 MUSCLE WEAKNESS (GENERALIZED): ICD-10-CM

## 2022-09-01 DIAGNOSIS — M54.50 CHRONIC BILATERAL LOW BACK PAIN WITHOUT SCIATICA: Primary | ICD-10-CM

## 2022-09-01 PROCEDURE — 97110 THERAPEUTIC EXERCISES: CPT | Performed by: PHYSICAL THERAPIST

## 2022-09-01 PROCEDURE — 97112 NEUROMUSCULAR REEDUCATION: CPT | Performed by: PHYSICAL THERAPIST

## 2022-09-01 NOTE — PROGRESS NOTES
Daily Note     Today's date: 2022  Patient name: Yareli Wood  : 1985  MRN: [id number]  Referring provider: Rochelle Wiggins PA-C  Dx:   Encounter Diagnosis     ICD-10-CM    1  Chronic bilateral low back pain without sciatica  M54 50     G89 29    2  Muscle weakness (generalized)  M62 81                   Subjective: Patient reports that his sxs are the same with not as much radiculopathy  He has his second injection scheduled for tomorrow  Objective: See treatment diary below      Assessment: Tolerated treatment well; initiated POC with MH to low back in prolonged extension while performing glute sets and prone quad stretch  VCs provided on proper sequencing with exercises; advanced core strengthening today  He reports having radiculopathy in to his L lower leg however after stretching his hamstring, he reports having less and no pain with exercise  Reviewed stretching to continue to perform at home  Patient demonstrated fatigue post treatment and would benefit from continued PT      Plan: Continue per plan of care        HEP: L lateral side gliding in standing, PERFECTO; PPU      Specialty Daily Treatment Diary       Manual    L sided positional distraction        L reverse [place] jt mob        PA glides lumbar spine   SMF     TPR L glute in sidelying        Tiger tail lumbar paraspinals        Tiger tail L calf and hamstring   SMF L calf SMF L calf    TPR L calf in prone        L LAD in prone   SMF in prone     TPR B lumbar paraspinals                 OP with PPU   SMF (5 sec; 2x10)     Guarded L lateral side glide with pushing hips to the right        Low back mobs with BLE press down and then rock to the side for L side        L piriformis stretch                Exercise Diary                 TERT with lumbar extension 5 mins  5 mins 5 mins 5 mins   Prone glute sets 5 sec x 30  5 sec x 30 5 sec x 30 5 sec x 30   Prone quad stretch c SOS 30 sec x 3 ea  30 sec x 3 ea 30 sec x 3 ea 30 sec x 3 ea                   PERFECTO-PPU 5 sec x 20  5 sec; 3x10 5 sec; 20 5 sec x 20   OP c PPU   NV?      Prone H' Ext  0# 3x10 ea  0# 10 ea 0# 2x10 ea 0# 3x10 ea   Femoral N glides   30             Standing lumbar extension        Standing Doorway L lateral trunk gliding (R arm on doorway with hips dropping to the right)        Standing Lumbar extension combo with L backward lean        Standing forward trunk flexion off to the right                TAC   10 sec x 10      TAC c Marching on table        TAC c marching LEs off table        TAC c OH reach with BLE lifted off table 5# 3x10 (feet off table)   5# 15 (feet on table); 5# 15 (feet off table) 5# 3x10 (feet off table)           TAC c SLR 1# 2x15 ea   0# 2x15 ea 0# 2x15 ea           TAC c Dying Bug 0# 2x10   NV 0# 15           TAC c LTR -> Lift BLEs off table Black ball in between knees: 2x10   2 sec x 10 2 sec; 2x10                    Cross Leg Rotation stretch        Ball Squeezes   10 sec x 20 (soccer ball) 10 sec x 20 (soccer ball) 10 sec x 20 (soccer ball)   Ball c bridge    5 sec x 20 (soccer ball) 5 sec x 20 (soccer ball)   TB Hooklying Clamshells   MTB 10 sec x 20  DC   TB Sidelying Clamshells DC   MTB 5 sec x 20 MTB 5 sec x 30   TB Bridges DC  MTB 5 sec x 10 MTB 5 sec x 20 MTB 5 sec x 20           DKTC   5 sec x 10             Supine Sciatic N Glide   2x10 (L only)  2 sec x 10 (L only)   Supine TFL stretch                 Hand Heel [place]ocks                Quadruped Alt UE c TAC 0# 10 ea   NV 0# 10 ea   TAC Quadruped Alt LE 0# 2x10   NV 0# 15 ea   TAC Quadruped Alt UE and LE 0# 10 ea   NV            Prone plank on elbows 20 sec x 2               Seated or Supine HS stretch Supine: 30 sec x 3 (L only)  Supine: 20 sec x 3 ea Supine: 30 sec x 3 ea Supine: 30 sec x 3 ea   Seated or Supine 2 way piriformis stretch  Supine: 30 sec x 3 ea  Supine: 20 sec x 3 (cross body pull) Supine: 30 sec x 3 ea Supine: 30 sec x 3 ea   Seated Forward Trunk Flexion using green PB 3 way                Slant board gastroc-soleus stretch 30 sec x 3 (gastroc only)  30 sec x 3 (gastroc only)   30 sec x 3 (gastroc only)                    Squats (goblet) 10# 3x10   NV 10# 3x10           Pallof Press OTB 5 sec x 20 ea   NV OTB 5 sec x 20 ea   Pallof Press with walk out        [employer] with trunk rotation                Lunges                HEP/EDU        Modalities        MH   10 mins in prone with lumbar extension with performing there ex 5 mins in prone with lumbar extension with performing there ex 5 mins in prone with lumbar extension with performing there ex   CP        EStim           Skin checks performed pre and post application: intact

## 2022-09-02 ENCOUNTER — HOSPITAL ENCOUNTER (OUTPATIENT)
Dept: RADIOLOGY | Facility: CLINIC | Age: 37
Discharge: HOME | End: 2022-09-02
Payer: COMMERCIAL

## 2022-09-02 VITALS
RESPIRATION RATE: 18 BRPM | DIASTOLIC BLOOD PRESSURE: 80 MMHG | TEMPERATURE: 98.5 F | SYSTOLIC BLOOD PRESSURE: 137 MMHG | OXYGEN SATURATION: 96 % | HEART RATE: 77 BPM

## 2022-09-02 DIAGNOSIS — M54.16 LUMBAR RADICULITIS: ICD-10-CM

## 2022-09-02 DIAGNOSIS — M51.26 LUMBAR DISC HERNIATION: ICD-10-CM

## 2022-09-02 PROCEDURE — 64484 NJX AA&/STRD TFRM EPI L/S EA: CPT | Performed by: ANESTHESIOLOGY

## 2022-09-02 PROCEDURE — 64483 NJX AA&/STRD TFRM EPI L/S 1: CPT | Performed by: ANESTHESIOLOGY

## 2022-09-02 RX ORDER — PAPAVERINE HCL 150 MG
15 CAPSULE, EXTENDED RELEASE ORAL ONCE
Status: COMPLETED | OUTPATIENT
Start: 2022-09-02 | End: 2022-09-02

## 2022-09-02 RX ADMIN — DEXAMETHASONE SODIUM PHOSPHATE 15 MG: 10 INJECTION, SOLUTION INTRAMUSCULAR; INTRAVENOUS at 14:05

## 2022-09-02 RX ADMIN — IOHEXOL 1 ML: 300 INJECTION, SOLUTION INTRAVENOUS at 14:05

## 2022-09-02 NOTE — DISCHARGE INSTRUCTIONS
Epidural Steroid Injection   WHAT YOU NEED TO KNOW:   An epidural steroid injection (JUANIS) is a procedure to inject steroid medicine into the epidural space  The epidural space is between your spinal cord and vertebrae  Steroids reduce inflammation and fluid buildup in your spine that may be causing pain  You may be given pain medicine along with the steroids  ACTIVITY  Do not drive or operate machinery today  No strenuous activity today - bending, lifting, etc   You may resume normal activites starting tomorrow - start slowly and as tolerated  You may shower today, but no tub baths or hot tubs  You may have numbness for several hours from the local anesthetic  Please use caution and common sense, especially with weight-bearing activities  CARE OF THE INJECTION SITE  If you have soreness or pain, apply ice to the area today (20 minutes on/20 minutes off)  Starting tomorrow, you may use warm, moist heat or ice if needed  You may have an increase or change in your discomfort for 36-48 hours after your treatment  Apply ice and continue with any pain medication you have been prescribed  Notify the [facility] if you have any of the following: redness, drainage, swelling, headache, stiff neck or fever above 100°F     SPECIAL INSTRUCTIONS  Our office will contact you in approximately 7 days for a progress report  MEDICATIONS  Continue to take all routine medications  Our office may have instructed you to hold some medications  As no general anesthesia was used in today's procedure, you should not experience any side effects related to anesthesia  If you are diabetic, the steroids used in today's injection may temporarily increase your blood sugar levels after the first few days after your injection  Please keep a close eye on your sugars and alert the doctor who manages your diabetes if your sugars are significantly high from your baseline or you are symptomatic       If you have a problem specifically related to your procedure, please call our office at [phone]  Problems not related to your procedure should be directed to your primary care physician

## 2022-09-02 NOTE — H&P
History of Present Illness: The patient is a 39 y o  male who presents with complaints of low back and leg pain  Patient Active Problem List   Diagnosis    Lumbar disc herniation    Lumbar radiculitis       No past medical history on file  No past surgical history on file  Current Outpatient Medications:     cyclobenzaprine (FLEXERIL) 10 mg tablet, Take 1 tablet (10 mg total) by mouth 3 (three) times a day as needed for muscle spasms, Disp: 60 tablet, Rfl: 3    methylprednisolone (MEDROL) 4 mg tablet, Medrol dose pack:  Take as directed  (Patient not taking: Reported on 6/22/2022), Disp: 21 tablet, Rfl: 0    naproxen (EC NAPROSYN) 500 MG EC tablet, Take 1 tablet (500 mg total) by mouth 2 (two) times a day with meals, Disp: 60 tablet, Rfl: 3    triamcinolone (KENALOG) 0 1 % cream, 2 times daily prn flares of poison ivy for 2-3 weeks then take 1 week break; repeat prn (Patient not taking: No sig reported), Disp: 30 g, Rfl: 3    Current Facility-Administered Medications:     dexamethasone (PF) (DECADRON) injection 15 mg, 15 mg, Epidural, Once, Ruslan Otero DO    iohexol (OMNIPAQUE) 300 mg/mL injection 50 mL, 50 mL, Epidural, Once, Ruslan Otero DO    No Known Allergies    Physical Exam:   General: Awake, Alert, Oriented x 3, Mood and affect appropriate  Respiratory: Respirations even and unlabored  Cardiovascular: Peripheral pulses intact; no edema  Musculoskeletal Exam:  Decreased range of motion lumbar spine    ASA Score: II         Assessment:   1  Lumbar radiculitis    2   Lumbar disc herniation        Plan: left L4 left L5 TFESI

## 2022-09-08 ENCOUNTER — APPOINTMENT (OUTPATIENT)
Dept: PHYSICAL THERAPY | Facility: CLINIC | Age: 37
End: 2022-09-08
Payer: COMMERCIAL

## 2022-09-09 ENCOUNTER — TELEPHONE (OUTPATIENT)
Dept: PAIN MEDICINE | Facility: CLINIC | Age: 37
End: 2022-09-09

## 2022-09-15 ENCOUNTER — OFFICE VISIT (OUTPATIENT)
Dept: PHYSICAL THERAPY | Facility: CLINIC | Age: 37
End: 2022-09-15
Payer: COMMERCIAL

## 2022-09-15 DIAGNOSIS — G89.29 CHRONIC BILATERAL LOW BACK PAIN WITHOUT SCIATICA: Primary | ICD-10-CM

## 2022-09-15 DIAGNOSIS — M54.50 CHRONIC BILATERAL LOW BACK PAIN WITHOUT SCIATICA: Primary | ICD-10-CM

## 2022-09-15 DIAGNOSIS — M62.81 MUSCLE WEAKNESS (GENERALIZED): ICD-10-CM

## 2022-09-15 PROCEDURE — 97112 NEUROMUSCULAR REEDUCATION: CPT | Performed by: PHYSICAL THERAPIST

## 2022-09-15 PROCEDURE — 97110 THERAPEUTIC EXERCISES: CPT | Performed by: PHYSICAL THERAPIST

## 2022-09-15 NOTE — PROGRESS NOTES
Daily Note     Today's date: 9/15/2022  Patient name: Kendrick Nash  : 1985  MRN: [id number]  Referring provider: Nnamdi Obrien PA-C  Dx:   Encounter Diagnosis     ICD-10-CM    1  Chronic bilateral low back pain without sciatica  M54 50     G89 29    2  Muscle weakness (generalized)  M62 81                   Subjective: Patient reports that overall he has noticed minimal pain in his back and down his leg  Objective: See treatment diary below      Assessment: Tolerated treatment well; initiated POC with MH to low back in lumbar extension with glute sets  VCs provided on proper sequencing with exercises; increased core strengthening exercises  He denies having increased pain throughout session  Patient demonstrated fatigue post treatment and would benefit from continued PT      Plan: Continue per plan of care        HEP: L lateral side gliding in standing, PERFECTO; PPU      Specialty Daily Treatment Diary       Manual 9/1 9/15  8/16 8/22   L sided positional distraction        L reverse [place] jt mob        PA glides lumbar spine        TPR L glute in sidelying        Tiger tail lumbar paraspinals        Tiger tail L calf and hamstring    SMF L calf    TPR L calf in prone        L LAD in prone        TPR B lumbar paraspinals                 OP with PPU        Guarded L lateral side glide with pushing hips to the right        Low back mobs with BLE press down and then rock to the side for L side        L piriformis stretch                Exercise Diary                 TERT with lumbar extension 5 mins 5 mins  5 mins 5 mins   Prone glute sets 5 sec x 30 5 sec x 30  5 sec x 30 5 sec x 30   Prone quad stretch c SOS 30 sec x 3 ea 30 sec x 3 ea  30 sec x 3 ea 30 sec x 3 ea                   PERFECTO-PPU 5 sec x 20 5 sec x 20  5 sec; 20 5 sec x 20   OP c PPU        Prone H' Ext  0# 3x10 ea 0# 2x15 ea  0# 2x10 ea 0# 3x10 ea   Femoral N glides                Standing lumbar extension        Standing Doorway L lateral trunk gliding (R arm on doorway with hips dropping to the right)        Standing Lumbar extension combo with L backward lean        Standing forward trunk flexion off to the right                TAC        TAC c Marching on table        TAC c marching LEs off table        TAC c OH reach with BLE lifted off table 5# 3x10 (feet off table) 5# 3x10 (feet off table to extension)  5# 15 (feet on table); 5# 15 (feet off table) 5# 3x10 (feet off table)           TAC c SLR 1# 2x15 ea 1# 2x15 ea  0# 2x15 ea 0# 2x15 ea           TAC c Dying Bug 0# 2x10 0# 3x10 ea  NV 0# 15           TAC c LTR -> Lift BLEs off table Black ball in between knees: 2x10 Black ball in between knees: 3x10  2 sec x 10 2 sec; 2x10                    Cross Leg Rotation stretch        Ball Squeezes    10 sec x 20 (soccer ball) 10 sec x 20 (soccer ball)   Ball c bridge    5 sec x 20 (soccer ball) 5 sec x 20 (soccer ball)   TB Hooklying Clamshells     DC   TB Sidelying Clamshells DC   MTB 5 sec x 20 MTB 5 sec x 30   TB Bridges DC   MTB 5 sec x 20 MTB 5 sec x 20           DKTC                Supine Sciatic N Glide     2 sec x 10 (L only)   Supine TFL stretch                 Hand Heel Rocks                Quadruped Alt UE c TAC 0# 10 ea DC  NV 0# 10 ea   TAC Quadruped Alt LE 0# 2x10 DC  NV 0# 15 ea   TAC Quadruped Alt UE and LE 0# 10 ea 0# 2x10 ea  NV            Hand Heel Rocks  5 sec x 10               Prone plank on elbows 20 sec x 2 20 sec x 3 ea              Side Planks  20 sec x 2 ea              Seated or Supine HS stretch Supine: 30 sec x 3 (L only)   Supine: 30 sec x 3 ea Supine: 30 sec x 3 ea   Seated or Supine 2 way piriformis stretch  Supine: 30 sec x 3 ea Supine: 30 sec x 3 ea  Supine: 30 sec x 3 ea Supine: 30 sec x 3 ea   Seated Forward Trunk Flexion using green PB 3 way                Slant board gastroc-soleus stretch 30 sec x 3 (gastroc only)    30 sec x 3 (gastroc only)                    Squats (goblet) 10# 3x10 15# 3x10  NV 10# 3x10           Pallof Press c trunk rotation OTB 5 sec x 20 ea OTB 5 sec x 20 ea  NV OTB 5 sec x 20 ea   Pallof Press with walk out        [employer] with trunk rotation                Lunges                HEP/EDU        Modalities        MH  5 mins in prone with lumbar extension with performing there ex  5 mins in prone with lumbar extension with performing there ex 5 mins in prone with lumbar extension with performing there ex   CP        EStim           Skin checks performed pre and post application: intact

## 2022-09-30 ENCOUNTER — OFFICE VISIT (OUTPATIENT)
Dept: PAIN MEDICINE | Facility: CLINIC | Age: 37
End: 2022-09-30
Payer: COMMERCIAL

## 2022-09-30 VITALS
WEIGHT: 212 LBS | DIASTOLIC BLOOD PRESSURE: 88 MMHG | SYSTOLIC BLOOD PRESSURE: 138 MMHG | TEMPERATURE: 97.7 F | HEIGHT: 69 IN | BODY MASS INDEX: 31.4 KG/M2 | HEART RATE: 60 BPM

## 2022-09-30 DIAGNOSIS — M54.16 LUMBAR RADICULITIS: Primary | ICD-10-CM

## 2022-09-30 DIAGNOSIS — M51.26 LUMBAR DISC HERNIATION: ICD-10-CM

## 2022-09-30 PROCEDURE — 99213 OFFICE O/P EST LOW 20 MIN: CPT | Performed by: NURSE PRACTITIONER

## 2022-09-30 NOTE — PROGRESS NOTES
Assessment:  1  Lumbar radiculitis    2  Lumbar disc herniation        Plan:  I discussed with the patient that since there has been moderate-significant improvement in the pain symptoms that we will hold off on any repeat injections at this point in time  However, I did explain that if over the next 12-16 weeks the pain symptoms should return, worsen, and/or change, we could consider a repeat injection  If after the 16 weeks and OV may be warranted for re-evaluation  The patient was agreeable and verbalized an understanding  I encouraged the patient to increase his home exercise program and activity level, slowly and steadily as tolerated, allowing pain to be his guide  The patient was agreeable and verbalized an understanding  I discussed with the patient that at this point time he can followup with our office on an as-needed basis  I did review the patient that if his pain symptoms should change, worsen, and/or if he would experience any new symptoms he would like to be evaluated for, he should give our office a call  The patient was agreeable and verbalized an understanding  History of Present Illness: The patient is a 39 y o  male last seen on 9/2/2022 who presents for a follow up office visit in regards to lumbar radiculopathy secondary to lumbar disc herniation  The patient currently reports that at this point time as he is status post his 2nd epidural steroid injection which was a left L4 and L5 transforaminal epidural steroid injection on September 2, 2022 and his previous injection was a left L5 and S1 transforaminal epidural steroid injection, both with Dr Donna Krishnan, that overall there is at least 80% improvement in his left lower extremity radicular symptoms and that his pain has significantly improved and is very minimal and intermittent  The patient presents today for regular postprocedure follow-up visit      I have personally reviewed and/or updated the patient's past medical history, past surgical history, family history, social history, current medications, allergies, and vital signs today  Review of Systems:    Review of Systems   Respiratory: Negative for shortness of breath  Cardiovascular: Negative for chest pain  Gastrointestinal: Negative for constipation, diarrhea, nausea and vomiting  Musculoskeletal: Negative for arthralgias, gait problem, joint swelling and myalgias  Skin: Negative for rash  Neurological: Positive for weakness  Negative for dizziness and seizures  All other systems reviewed and are negative  History reviewed  No pertinent past medical history  History reviewed  No pertinent surgical history  History reviewed  No pertinent family history  Social History     Occupational History    Not on file   Tobacco Use    Smoking status: Never Smoker    Smokeless tobacco: Never Used   Vaping Use    Vaping Use: Never used   Substance and Sexual Activity    Alcohol use: Yes     Comment: infrequent    Drug use: Never    Sexual activity: Not on file       No current outpatient medications on file  No Known Allergies    Physical Exam:    /88 (BP Location: Left arm, Patient Position: Sitting, Cuff Size: Standard)   Pulse 60   Temp 97 7 °F (36 5 °C)   Ht 5' 9" (1 753 m)   Wt 96 2 kg (212 lb)   BMI 31 31 kg/m²     Constitutional:normal, well developed, well nourished, alert, in no distress and non-toxic and no overt pain behavior  Eyes:anicteric  HEENT:grossly intact  Neck:supple, symmetric, trachea midline and no masses   Pulmonary:even and unlabored  Cardiovascular:No edema or pitting edema present  Skin:Normal without rashes or lesions and well hydrated  Psychiatric:Mood and affect appropriate  Neurologic:Cranial Nerves II-XII grossly intact  Musculoskeletal:normal      Imaging  No orders to display         No orders of the defined types were placed in this encounter

## 2022-10-13 ENCOUNTER — APPOINTMENT (OUTPATIENT)
Dept: PHYSICAL THERAPY | Facility: CLINIC | Age: 37
End: 2022-10-13

## 2022-10-14 ENCOUNTER — OFFICE VISIT (OUTPATIENT)
Dept: PHYSICAL THERAPY | Facility: CLINIC | Age: 37
End: 2022-10-14
Payer: COMMERCIAL

## 2022-10-14 DIAGNOSIS — M62.81 MUSCLE WEAKNESS (GENERALIZED): ICD-10-CM

## 2022-10-14 DIAGNOSIS — G89.29 CHRONIC BILATERAL LOW BACK PAIN WITHOUT SCIATICA: Primary | ICD-10-CM

## 2022-10-14 DIAGNOSIS — M54.50 CHRONIC BILATERAL LOW BACK PAIN WITHOUT SCIATICA: Primary | ICD-10-CM

## 2022-10-14 PROCEDURE — 97112 NEUROMUSCULAR REEDUCATION: CPT | Performed by: PHYSICAL THERAPIST

## 2022-10-14 PROCEDURE — 97110 THERAPEUTIC EXERCISES: CPT | Performed by: PHYSICAL THERAPIST

## 2022-10-14 NOTE — PROGRESS NOTES
Daily Note/Discharge note     Today's date: 10/14/2022  Patient name: Loan Max  : 1985  MRN: [id number]  Referring provider: Mary Grace Muro PA-C  Dx:   Encounter Diagnosis     ICD-10-CM    1  Chronic bilateral low back pain without sciatica  M54 50     G89 29    2  Muscle weakness (generalized)  M62 81                   Subjective: Patient reports that overall after his last injection he has had relief; however, when he is squatting using a barbell he has had increased sxs in his LE  Objective: See treatment diary below  He has met all goals at current time  Assessment: Tolerated treatment well; initiated POC with MH in prolonged lumbar extension  VCs provided on proper sequencing with exercises  Patient is currently being discharged with updated HEP due to insurance authorization with visits  He was instructed to contact therapist with any questions and/or concerns         Plan: Discharge today     HEP: L lateral side gliding in standing, PERFECTO; PPU      Specialty Daily Treatment Diary       Manual 9/1 9/15 10/14     L sided positional distraction        L reverse [place] jt mob        PA glides lumbar spine        TPR L glute in sidelying        Tiger tail lumbar paraspinals        Tiger tail L calf and hamstring        TPR L calf in prone        L LAD in prone        TPR B lumbar paraspinals                 OP with PPU        Guarded L lateral side glide with pushing hips to the right        Low back mobs with BLE press down and then rock to the side for L side        L piriformis stretch                Exercise Diary                 TERT with lumbar extension 5 mins 5 mins 5 mins     Prone glute sets 5 sec x 30 5 sec x 30 5 sec x 30     Prone quad stretch c SOS 30 sec x 3 ea 30 sec x 3 ea 30 sec x 3 ea                     PERFECTO-PPU 5 sec x 20 5 sec x 20      OP c PPU        Prone H' Ext  0# 3x10 ea 0# 2x15 ea      Femoral N glides                Swimmers   10 ea     Supermans   10x Standing lumbar extension        Standing Doorway L lateral trunk gliding (R arm on doorway with hips dropping to the right)        Standing Lumbar extension combo with L backward lean        Standing forward trunk flexion off to the right                TAC        TAC c Marching on table        TAC c marching LEs off table        TAC c OH reach with BLE lifted off table 5# 3x10 (feet off table) 5# 3x10 (feet off table to extension) 5# 20 ea (feet off table with extension)             TAC c SLR 1# 2x15 ea 1# 2x15 ea 2# 20 ea     Sidelying H' ABD   2# 20 ea             TAC c Dying Bug 0# 2x10 0# 3x10 ea              TAC c LTR -> Lift BLEs off table Black ball in between knees: 2x10 Black ball in between knees: 3x10 Black ball in between knees: 2x15     Turtles/ Penguins (Heel taps)   2x10             Cross Leg Rotation stretch        Jose M c bridge        TB Hooklying Clamshells        TB Sidelying Clamshells DC       TB Bridges DC               DKTC                Supine Sciatic N Glide        Supine TFL stretch                 Hand Heel Rocks                Quadruped Alt UE c TAC 0# 10 ea DC      TAC Quadruped Alt LE 0# 2x10 DC      TAC Quadruped Alt UE and LE 0# 10 ea 0# 2x10 ea              Hand Heel Rocks  5 sec x 10               Prone plank on elbows 20 sec x 2 20 sec x 3 ea 30 sec x 3 ea             Side Planks  20 sec x 2 ea At home             Seated or Supine HS stretch Supine: 30 sec x 3 (L only)       Seated or Supine 2 way piriformis stretch  Supine: 30 sec x 3 ea Supine: 30 sec x 3 ea      Seated Forward Trunk Flexion using green PB 3 way                Slant board gastroc-soleus stretch 30 sec x 3 (gastroc only)                       Squats (goblet) 10# 3x10 15# 3x10              Pallof Press c trunk rotation OTB 5 sec x 20 ea OTB 5 sec x 20 ea      Pallof Press with walk out        GCLABS ([employer]) with trunk rotation                Lunges                HEP/EDU Modalities        MH  5 mins in prone with lumbar extension with performing there ex 5 mins in prone with lumbar extension with performing there ex     CP        EStim           Skin checks performed pre and post application: intact

## 2022-10-24 ENCOUNTER — OFFICE VISIT (OUTPATIENT)
Dept: DERMATOLOGY | Facility: CLINIC | Age: 37
End: 2022-10-24
Payer: COMMERCIAL

## 2022-10-24 VITALS — WEIGHT: 205 LBS | TEMPERATURE: 97.6 F | HEIGHT: 69 IN | BODY MASS INDEX: 30.36 KG/M2

## 2022-10-24 DIAGNOSIS — Z87.898 HISTORY OF ATYPICAL NEVUS: Primary | ICD-10-CM

## 2022-10-24 PROCEDURE — 99212 OFFICE O/P EST SF 10 MIN: CPT | Performed by: DERMATOLOGY

## 2022-10-24 NOTE — PATIENT INSTRUCTIONS
FOLLOW UP FOR  MELANOCYTIC NEVI- REMOVED AT LAST VISIT     Assessment and Plan:  Based on a thorough discussion of this condition and the management approach to it (including a comprehensive discussion of the known risks, side effects and potential benefits of treatment), the patient (family) agrees to implement the following specific plan:  When outside we recommend using a wide brim hat, sunglasses, long sleeve and pants, sunscreen with SPF 15+ with reapplication every 2 hours, or SPF specific clothing  Moderately atypical mole on left palm with no signs of recurrence   Right posterior thigh mole, re-pigmenting no concerns at this time given initial biopsy shown benign mole   Continue with annual skin checks

## 2022-10-24 NOTE — PROGRESS NOTES
[facility] Follow Up Note    Patient Name: Johnny Chery  Encounter Date: 10/24/22    Today's Chief Concerns:  • Concern #1:  Mole follow up     Current Medications:  No current outpatient medications on file  CONSTITUTIONAL:   Vitals:    10/24/22 0813   Temp: 97 6 °F (36 4 °C)   TempSrc: Temporal   Weight: 93 kg (205 lb)   Height: 5' 9" (1 753 m)             Specific Alerts:    Have you been seen by a [facility]atologist in the last 3 years? YES    No Known Allergies    May we call your Preferred Phone number to discuss your specific medical information? YES    May we leave a detailed message that includes your specific medical information? YES    Have you traveled outside of the [facility] in the past 3 months? No    Do you currently have a pacemaker or defibrillator? No    Do you have any artificial heart valves, joints, plates, screws, rods, stents, pins, etc? No   - If Yes, were any placed within the last 2 years? Do you require any medications prior to a surgical procedure? No   - If Yes, for which procedure? - If Yes, what medications to you require? Are you taking any medications that cause you to bleed more easily ("blood thinners") No    Have you ever experienced a rapid heartbeat with epinephrine? No    Have you ever been treated with "gold" (gold sodium thiomalate) therapy? No    [facility] can help with wrinkles, "laugh lines," facial volume loss, "double chin," "love handles," age spots, and more  Are you interested in learning today about some of the skin enhancement procedures that we offer? (If Yes, please provide more detail) No    Review of Systems:  Have you recently had or currently have any of the following?     · Fever or chills: No  · Night Sweats: No  · Headaches: No  · Weight Gain: No  · Weight Loss: No  · Blurry Vision: No  · Nausea: No  · Vomiting: No  · Diarrhea: No  · Blood in Stool: No  · Abdominal Pain: No  · Itchy Skin: No  · Painful Joints: No  · Swollen Joints: No  · Muscle Pain: No  · Irregular Mole: No  · Sun Burn: No  · Dry Skin: No  · Skin Color Changes: No  · Scar or Keloid: No  · Cold Sores/Fever Blisters: No  · Bacterial Infections/MRSA: No  · Anxiety: No  · Depression: No  · Suicidal or Homicidal Thoughts: No      PSYCH: Normal mood and affect  EYES: Normal conjunctiva  ENT: Normal lips and oral mucosa  CARDIOVASCULAR: No edema  RESPIRATORY: Normal respirations  HEME/LYMPH/IMMUNO:  No regional lymphadenopathy except as noted below in ASSESSMENT AND PLAN BY DIAGNOSIS    FULL ORGAN SYSTEM SKIN EXAM (SKIN)   Left Hand/Fingers Normal except as noted below in Assessment   Right Leg, Foot, Toes Normal except as noted below in Assessment       FOLLOW UP FOR  MELANOCYTIC NEVI- REMOVED AT LAST VISIT   Physical Exam:  • Anatomic Location Affected:  Left palm,  Right shoulder, right posterior thigh   • Morphological Description:  Slight re-pigmentation on right posterior thigh  • Pertinent Positives:  • Pertinent Negatives:     Additional History of Present Condition:  Patient presents for follow up visit     Assessment and Plan:  Based on a thorough discussion of this condition and the management approach to it (including a comprehensive discussion of the known risks, side effects and potential benefits of treatment), the patient (family) agrees to implement the following specific plan:  • When outside we recommend using a wide brim hat, sunglasses, long sleeve and pants, sunscreen with SPF 95+ with reapplication every 2 hours, or SPF specific clothing  • Moderately atypical mole on left palm with no signs of recurrence   • Right posterior thigh mole, re-pigmenting no concerns at this time given initial biopsy shown benign mole   • Continue with annual skin checks      Scribe Attestation    I,:  Brenda Kyle am acting as a scribe while in the presence of the attending physician :       I,:  Aria Christina MD personally performed the services described in this documentation    as scribed in my presence :       Lawyer Talia MD  Dermatology PGY-2 Resident

## 2022-11-09 ENCOUNTER — TELEPHONE (OUTPATIENT)
Dept: PAIN MEDICINE | Facility: CLINIC | Age: 37
End: 2022-11-09

## 2022-11-09 DIAGNOSIS — M51.26 LUMBAR DISC HERNIATION: ICD-10-CM

## 2022-11-09 DIAGNOSIS — M54.16 LUMBAR RADICULITIS: Primary | ICD-10-CM

## 2022-11-09 NOTE — TELEPHONE ENCOUNTER
Caller: Patient    Doctor:sabina Weathers [facility]    Reason for call: Patient is returning missed call    Call back#: [phone]

## 2022-11-09 NOTE — TELEPHONE ENCOUNTER
We could try an L5-S1 LESI to the left with SL and see if that helps and then go from there  Let me know if he is interested in a repeat injection  His other option is to consider a surgical evaluation if he has not already had one OR we could consider a neuropathic medication such as Gabapentin or Lyrica?

## 2022-11-09 NOTE — TELEPHONE ENCOUNTER
S/w pt, reports pain along L side radiating from lower back / buttock all the way to big toe on L side  States pain is consistent with what he had experienced prior to most recent injection (L L4 L5 TFESI most recently performed on 9/2)  Pt states he did receive relief from injection, states he has also had relief with oral steroids in the past  Pt currently managing pain with Motrin during the day, states symptoms worsen at night and with sitting / laying down  Please advise, thank you

## 2022-11-09 NOTE — TELEPHONE ENCOUNTER
Caller: patient    Doctor:     Reason for call: patient is reporting having a flare up and was instructed to call [facility] if that were to happen       Call back#: [phone]

## 2022-11-09 NOTE — TELEPHONE ENCOUNTER
S/w pt, advised of the same  Pt verbalized understanding, states he is leaning towards injection option but would like to see how / if pain progresses over the next few days  He will cb with an update and decision

## 2022-11-09 NOTE — TELEPHONE ENCOUNTER
LMOM to cb  Provided cb number and office hours  NOTE:    Per 9/30/22 ov note:  I discussed with the patient that since there has been moderate-significant improvement in the pain symptoms that we will hold off on any repeat injections at this point in time  However, I did explain that if over the next 12-16 weeks the pain symptoms should return, worsen, and/or change, we could consider a repeat injection  If after the 16 weeks and OV may be warranted for re-evaluation

## 2022-11-10 NOTE — TELEPHONE ENCOUNTER
Caller: Karli Khan    Doctor: Marco A Orr    Reason for call: patient has decided to go ahead with injection     Call back#: [phone]

## 2022-11-10 NOTE — TELEPHONE ENCOUNTER
Left message to call back to schedule for procedure with Dr Emmy Tijerina  And a follow up appt with ANDREA

## 2022-11-10 NOTE — TELEPHONE ENCOUNTER
Can you please call the patient to schedule him for an L5-S1 LESI to the Left with Dr Abram Fraga and then a f/u OV 2-3 weeks after the injection with the first available NP? I put the order in  Thank you

## 2022-11-10 NOTE — TELEPHONE ENCOUNTER
PRE OP INSTRUCTIONS:     -If you are on prescription blood thinners, you may have to hold the medication for several days before the procedure  Please call the office to    discuss medication holds at [phone]   -Do not eat or drink ONE HOUR prior to your procedure  If you are diabetic, may follow regular breakfast/lunch schedule and take usual    diabetic medications   -Lumbar( low back) procedure, please wear comfortable slacks/pants   -Cervical (neck) procedure, please wear a shirt/blouse that is easy to remove   -A  is required to take you home form your procedure   -Continue all to take prescribed medication the day of your procedure, including blood pressure medications   -If you are prescribe antibiotics, have an active infection or have an open wound, please contact the office at [phone]   -Please refrain from any vaccinations two weeks before and two weeks after injection   -Insurance authorization received in not a guarantee of payment per your insurance company's authorization disclaimer and it is    your responsibility to verify your benefits  -If you have any questions about the instructions, please call me at [phone]    Hold medication  x _ full days prior, last dose on _  Patient advised to hold medication from Date till their appointment at that time instructions to restart will be given  Patient stated verbal understanding  Aware that nursing will call her to review hold dates as well

## 2022-11-18 ENCOUNTER — HOSPITAL ENCOUNTER (OUTPATIENT)
Dept: RADIOLOGY | Facility: CLINIC | Age: 37
Discharge: HOME | End: 2022-11-18

## 2022-11-18 VITALS
SYSTOLIC BLOOD PRESSURE: 148 MMHG | OXYGEN SATURATION: 97 % | RESPIRATION RATE: 20 BRPM | HEART RATE: 69 BPM | TEMPERATURE: 97.7 F | DIASTOLIC BLOOD PRESSURE: 90 MMHG

## 2022-11-18 DIAGNOSIS — M51.26 LUMBAR DISC HERNIATION: ICD-10-CM

## 2022-11-18 DIAGNOSIS — M54.16 LUMBAR RADICULITIS: ICD-10-CM

## 2022-11-18 RX ORDER — METHYLPREDNISOLONE ACETATE 80 MG/ML
80 INJECTION, SUSPENSION INTRA-ARTICULAR; INTRALESIONAL; INTRAMUSCULAR; PARENTERAL; SOFT TISSUE ONCE
Status: COMPLETED | OUTPATIENT
Start: 2022-11-18 | End: 2022-11-18

## 2022-11-18 RX ADMIN — IOHEXOL 1 ML: 300 INJECTION, SOLUTION INTRAVENOUS at 14:37

## 2022-11-18 RX ADMIN — METHYLPREDNISOLONE ACETATE 80 MG: 80 INJECTION, SUSPENSION INTRA-ARTICULAR; INTRALESIONAL; INTRAMUSCULAR; SOFT TISSUE at 14:38

## 2022-11-18 NOTE — DISCHARGE INSTRUCTIONS
Epidural Steroid Injection   WHAT YOU NEED TO KNOW:   An epidural steroid injection (JUANIS) is a procedure to inject steroid medicine into the epidural space  The epidural space is between your spinal cord and vertebrae  Steroids reduce inflammation and fluid buildup in your spine that may be causing pain  You may be given pain medicine along with the steroids  ACTIVITY  Do not drive or operate machinery today  No strenuous activity today - bending, lifting, etc   You may resume normal activites starting tomorrow - start slowly and as tolerated  You may shower today, but no tub baths or hot tubs  You may have numbness for several hours from the local anesthetic  Please use caution and common sense, especially with weight-bearing activities  CARE OF THE INJECTION SITE  If you have soreness or pain, apply ice to the area today (20 minutes on/20 minutes off)  Starting tomorrow, you may use warm, moist heat or ice if needed  You may have an increase or change in your discomfort for 36-48 hours after your treatment  Apply ice and continue with any pain medication you have been prescribed  Notify the [facility] if you have any of the following: redness, drainage, swelling, headache, stiff neck or fever above 100°F     SPECIAL INSTRUCTIONS  Our office will contact you in approximately 7 days for a progress report  MEDICATIONS  Continue to take all routine medications  Our office may have instructed you to hold some medications  As no general anesthesia was used in today's procedure, you should not experience any side effects related to anesthesia  If you are diabetic, the steroids used in today's injection may temporarily increase your blood sugar levels after the first few days after your injection  Please keep a close eye on your sugars and alert the doctor who manages your diabetes if your sugars are significantly high from your baseline or you are symptomatic       If you have a problem specifically related to your procedure, please call our office at [phone]  Problems not related to your procedure should be directed to your primary care physician

## 2022-11-18 NOTE — H&P
History of Present Illness: The patient is a 39 y o  male who presents with complaints of back and leg pain  No past medical history on file  No past surgical history on file  No current outpatient medications on file  Current Facility-Administered Medications:   •  iohexol (OMNIPAQUE) 300 mg/mL injection 50 mL, 50 mL, Epidural, Once, Ruslan Otero,   •  methylPREDNISolone acetate (DEPO-MEDROL) injection 80 mg, 80 mg, Epidural, Once, Ruslan Otero DO    No Known Allergies    Physical Exam:   General: Awake, Alert, Oriented x 3, Mood and affect appropriate  Respiratory: Respirations even and unlabored  Cardiovascular: Peripheral pulses intact; no edema  Musculoskeletal Exam:  Decreased range of motion lumbar spine    ASA Score: I         Assessment:   1  Lumbar radiculitis    2   Lumbar disc herniation        Plan: L5-S1 LESI to the Left

## 2022-11-25 ENCOUNTER — TELEPHONE (OUTPATIENT)
Dept: PAIN MEDICINE | Facility: CLINIC | Age: 37
End: 2022-11-25

## 2022-11-25 NOTE — TELEPHONE ENCOUNTER
1st attempt  Lm to cb with % improvement and pain level       L5-S1 LESI to the Left 11/18, 12/9 F/u

## 2022-11-29 NOTE — TELEPHONE ENCOUNTER
Caller: Génesis Tim   Doctor/office:Dr Otero  CB#: [phone]    % of improvement: 80%  Pain Scale (1-10): 2/10 in the morning

## 2022-12-09 ENCOUNTER — OFFICE VISIT (OUTPATIENT)
Dept: PAIN MEDICINE | Facility: CLINIC | Age: 37
End: 2022-12-09

## 2022-12-09 VITALS
WEIGHT: 215 LBS | HEART RATE: 76 BPM | TEMPERATURE: 98.4 F | BODY MASS INDEX: 31.84 KG/M2 | SYSTOLIC BLOOD PRESSURE: 138 MMHG | DIASTOLIC BLOOD PRESSURE: 82 MMHG | HEIGHT: 69 IN

## 2022-12-09 DIAGNOSIS — G89.29 CHRONIC BILATERAL LOW BACK PAIN WITHOUT SCIATICA: Primary | ICD-10-CM

## 2022-12-09 DIAGNOSIS — M51.26 LUMBAR DISC HERNIATION: ICD-10-CM

## 2022-12-09 DIAGNOSIS — M54.50 CHRONIC BILATERAL LOW BACK PAIN WITHOUT SCIATICA: Primary | ICD-10-CM

## 2022-12-09 DIAGNOSIS — M54.16 LUMBAR RADICULITIS: ICD-10-CM

## 2022-12-09 RX ORDER — CELECOXIB 100 MG/1
CAPSULE ORAL
Qty: 45 CAPSULE | Refills: 1 | Status: SHIPPED | OUTPATIENT
Start: 2022-12-09

## 2022-12-09 NOTE — PATIENT INSTRUCTIONS
Lower Back Exercises   AMBULATORY CARE:   Lower back exercises  help heal and strengthen your back muscles to prevent another injury  Ask your healthcare provider if you need to see a physical therapist for more advanced exercises  Seek care immediately if:   You have severe pain that prevents you from moving  Contact your healthcare provider if:   Your pain becomes worse  You have new pain  You have questions or concerns about your condition or care  Do lower back exercises safely:   Do the exercises on a mat or firm surface  (not on a bed) to support your spine and prevent low back pain  Move slowly and smoothly  Avoid fast or jerky motions  Breathe normally  Do not hold your breath  Stop if you feel pain  It is normal to feel some discomfort at first  Regular exercise will help decrease your discomfort over time  Lower back exercises: Your healthcare provider may recommend that you do back exercises 10 to 30 minutes each day  He may also recommend that you do exercises 1 to 3 times each day  Ask your healthcare provider which exercises are best for you and how often to do them  Ankle pumps:  Lie on your back  Move your foot up (with your toes pointing toward your head)  Then, move your foot down (with your toes pointing away from you)  Repeat this exercise 10 times on each side  Heel slides:  Lie on your back  Slowly bend one leg and then straighten it  Next, bend the other leg and then straighten it  Repeat 10 times on each side  Pelvic tilt:  Lie on your back with your knees bent and feet flat on the floor  Place your arms in a relaxed position beside your body  Tighten the muscles of your abdomen and flatten your back against the floor  Hold for 5 seconds  Repeat 5 times  Back stretch:  Lie on your back with your hands behind your head  Bend your knees and turn the lower half of your body to one side  Hold this position for 10 seconds   Repeat 3 times on each side  Straight leg raises:  Lie on your back with one leg straight  Bend the other knee  Tighten your abdomen and then slowly lift the straight leg up about 6 to 12 inches off the floor  Hold for 1 to 5 seconds  Lower your leg slowly  Repeat 10 times on each leg  Knee-to-chest:  Lie on your back with your knees bent and feet flat on the floor  Pull one of your knees toward your chest and hold it there for 5 seconds  Return your leg to the starting position  Lift the other knee toward your chest and hold for 5 seconds  Do this 5 times on each side  Cat and camel:  Place your hands and knees on the floor  Arch your back upward toward the ceiling and lower your head  Round out your spine as much as you can  Hold for 5 seconds  Lift your head upward and push your chest downward toward the floor  Hold for 5 seconds  Do 3 sets or as directed  Wall squats:  Stand with your back against a wall  Tighten the muscles of your abdomen  Slowly lower your body until your knees are bent at a 45 degree angle  Hold this position for 5 seconds  Slowly move back up to a standing position  Repeat 10 times  Curl up:  Lie on your back with your knees bent and feet flat on the floor  Place your hands, palms down, underneath the curve in your lower back  Next, with your elbows on the floor, lift your shoulders and chest 2 to 3 inches  Keep your head in line with your shoulders  Hold this position for 5 seconds  When you can do this exercise without pain for 10 to 15 seconds, you may add a rotation  While your shoulders and chest are lifted off the ground, turn slightly to the left and hold  Repeat on the other side  Bird dog:  Place your hands and knees on the floor  Keep your wrists directly below your shoulders and your knees directly below your hips  Pull your belly button in toward your spine  Do not flatten or arch your back  Tighten your abdominal muscles   Raise one arm straight out so that it is aligned with your head  Next, raise the leg opposite your arm  Hold this position for 15 seconds  Lower your arm and leg slowly and change sides  Do 5 sets  © Copyright [employer] 2022 Information is for End User's use only and may not be sold, redistributed or otherwise used for commercial purposes  All illustrations and images included in CareNotes® are the copyrighted property of A D A [employer] Inc  or [facility]   The above information is an  only  It is not intended as medical advice for individual conditions or treatments  Talk to your doctor, nurse or pharmacist before following any medical regimen to see if it is safe and effective for you

## 2022-12-09 NOTE — PROGRESS NOTES
Assessment:  1  Chronic bilateral low back pain without sciatica    2  Lumbar radiculitis    3  Lumbar disc herniation        Plan:  While the patient was in the office today, I discussed with the patient that at this point time since he is noting at least 50% overall improvement of his left-sided greater than right low back and left lower extremity radicular symptoms and he has had 3 injections this year, for now, we would need to hold off any repeat injections for at least the next 3 months, if not longer  The patient was agreeable and verbalized an understanding  With regards to other treatment options we did have a discussion about possibly considering a surgical opinion as even though the patient is noting moderate relief, it does seem that the injections were not as long-lasting as he would like and he may want to consider a more permanent solution  However, for now, the patient preferred to hold off on a surgical consultation and try some alternative noninvasive options first     We did discuss the possibility of neuropathic medications, however, the patient did not seem interested in being on medication he had to take consistently, however, we also discussed the possibility of prescribing a better anti-inflammatory than over-the-counter ibuprofen such as Celebrex, 100 mg twice daily as needed for pain that he can use during flareups and as needed depending on how he is doing  I discussed with the patient that while taking a prescription NSAID, they should not take any other oral NSAIDS except for acetaminophen or Tylenol and should always take the NSAID with food to prevent any gastrointestinal irritation/bleeding  The patient is to call our office if they experience any side effects or issues while on this medication       I also discussed with the patient that with regards to his home exercise and stretching program, he is to slowly and steadily increase his activity, as tolerated, allowing pain to be his guide and I did give him some home exercises and stretching that he can try at home and encouraged him to utilize 20 minutes of low heat before his home exercises and stretching and again reminded him that it needs to be a slow and steady process  The patient was agreeable and verbalized an understanding  The patient will follow-up in 12 weeks for medication prescription refill and reevaluation  The patient was advised to contact the office should their symptoms worsen in the interim  The patient was agreeable and verbalized an understanding  History of Present Illness: The patient is a 40 y o  male last seen on 11/18/2022 who presents for a follow up office visit in regards to chronic low back pain with radiculopathy secondary to a lumbar disc herniation  The patient currently reports that since his last office visit as he is status post a repeat L5-S1 interlaminar lumbar  injection directed towards the left with Dr Recardo Simmonds on November 18, 2022, but there is at least 50% improvement currently of his pain symptoms although initially there was definitely 80% improvement after the repeat injection as this was his third injection since this summer  However, the patient reports that the relief he was experiencing initially seem to fade after he tried to get back to working out with his normal routine a week or 2 ago and he deafly has noticed some worsening and returning pain symptoms and feels he probably just did too much  The patient presents today for regular postprocedure follow-up visit and to discuss his treatment plan options  I have personally reviewed and/or updated the patient's past medical history, past surgical history, family history, social history, current medications, allergies, and vital signs today  Review of Systems:    Review of Systems   Respiratory: Negative for shortness of breath  Cardiovascular: Negative for chest pain     Gastrointestinal: Negative for constipation, diarrhea, nausea and vomiting  Musculoskeletal: Positive for gait problem  Negative for arthralgias, joint swelling and myalgias  Skin: Negative for rash  Neurological: Positive for weakness  Negative for dizziness and seizures  All other systems reviewed and are negative  History reviewed  No pertinent past medical history  History reviewed  No pertinent surgical history  History reviewed  No pertinent family history  Social History     Occupational History   • Not on file   Tobacco Use   • Smoking status: Never   • Smokeless tobacco: Never   Vaping Use   • Vaping Use: Never used   Substance and Sexual Activity   • Alcohol use: Yes     Comment: infrequent   • Drug use: Never   • Sexual activity: Not on file         Current Outpatient Medications:   •  celecoxib (CeleBREX) 100 mg capsule, Take 1 PO BID PRN for pain with food  NO OTHER NSAIDS , Disp: 45 capsule, Rfl: 1    No Known Allergies    Physical Exam:    /82 (BP Location: Left arm, Patient Position: Sitting, Cuff Size: Large)   Pulse 76   Temp 98 4 °F (36 9 °C)   Ht 5' 9" (1 753 m)   Wt 97 5 kg (215 lb)   BMI 31 75 kg/m²     Constitutional:normal, well developed, well nourished, alert, in no distress and non-toxic and no overt pain behavior  Eyes:anicteric  HEENT:grossly intact  Neck:supple, symmetric, trachea midline and no masses   Pulmonary:even and unlabored  Cardiovascular:No edema or pitting edema present  Skin:Normal without rashes or lesions and well hydrated  Psychiatric:Mood and affect appropriate  Neurologic:Cranial Nerves II-XII grossly intact  Musculoskeletal:normal      Imaging  No orders to display         No orders of the defined types were placed in this encounter

## 2024-02-21 ENCOUNTER — OFFICE VISIT (OUTPATIENT)
Dept: DERMATOLOGY | Facility: CLINIC | Age: 39
End: 2024-02-21

## 2024-02-21 VITALS — HEIGHT: 69 IN | WEIGHT: 213 LBS | TEMPERATURE: 97.6 F | BODY MASS INDEX: 31.55 KG/M2

## 2024-02-21 DIAGNOSIS — D22.5 MULTIPLE BENIGN MELANOCYTIC NEVI OF UPPER AND LOWER EXTREMITIES AND TRUNK: ICD-10-CM

## 2024-02-21 DIAGNOSIS — D22.62 MULTIPLE BENIGN MELANOCYTIC NEVI OF UPPER AND LOWER EXTREMITIES AND TRUNK: ICD-10-CM

## 2024-02-21 DIAGNOSIS — D22.61 MULTIPLE BENIGN MELANOCYTIC NEVI OF UPPER AND LOWER EXTREMITIES AND TRUNK: ICD-10-CM

## 2024-02-21 DIAGNOSIS — L57.8 OTHER SKIN CHANGES DUE TO CHRONIC EXPOSURE TO NONIONIZING RADIATION: ICD-10-CM

## 2024-02-21 DIAGNOSIS — D22.71 MULTIPLE BENIGN MELANOCYTIC NEVI OF UPPER AND LOWER EXTREMITIES AND TRUNK: ICD-10-CM

## 2024-02-21 DIAGNOSIS — Z12.83 SKIN CANCER SCREENING: Primary | ICD-10-CM

## 2024-02-21 DIAGNOSIS — D22.72 MULTIPLE BENIGN MELANOCYTIC NEVI OF UPPER AND LOWER EXTREMITIES AND TRUNK: ICD-10-CM

## 2024-02-21 DIAGNOSIS — D48.5 NEOPLASM OF UNCERTAIN BEHAVIOR OF SKIN: ICD-10-CM

## 2024-02-21 DIAGNOSIS — L81.4 LENTIGINES: ICD-10-CM

## 2024-02-21 DIAGNOSIS — D18.01 CHERRY ANGIOMA: ICD-10-CM

## 2024-02-21 PROCEDURE — 88341 IMHCHEM/IMCYTCHM EA ADD ANTB: CPT | Performed by: STUDENT IN AN ORGANIZED HEALTH CARE EDUCATION/TRAINING PROGRAM

## 2024-02-21 PROCEDURE — 88305 TISSUE EXAM BY PATHOLOGIST: CPT | Performed by: STUDENT IN AN ORGANIZED HEALTH CARE EDUCATION/TRAINING PROGRAM

## 2024-02-21 PROCEDURE — 88342 IMHCHEM/IMCYTCHM 1ST ANTB: CPT | Performed by: STUDENT IN AN ORGANIZED HEALTH CARE EDUCATION/TRAINING PROGRAM

## 2024-02-21 NOTE — PATIENT INSTRUCTIONS
"NEOPLASM OF UNCERTAIN BEHAVIOR OF SKIN    Physical Exam:  (Anatomic Location); (Size and Morphological Description); (Differential Diagnosis):  Specimen A: left abdomen; skin; shave biopsy; 38 year old male with a multi colored macule; DDX: atypical mole vs lentigo  Pertinent Positives:  Pertinent Negatives:    Additional History of Present Condition:  present on exam    Assessment and Plan:  I have discussed with the patient that a sample of skin via a \"skin biopsy” would be potentially helpful to further make a specific diagnosis under the microscope.  Based on a thorough discussion of this condition and the management approach to it (including a comprehensive discussion of the known risks, side effects and potential benefits of treatment), the patient (family) agrees to implement the following specific plan:    Procedure:  Skin Biopsy.  After a thorough discussion of treatment options and risk/benefits/alternatives (including but not limited to local pain, scarring, dyspigmentation, blistering, possible superinfection, and inability to confirm a diagnosis via histopathology), verbal and written consent were obtained and portion of the rash was biopsied for tissue sample.  See below for consent that was obtained from patient and subsequent Procedure Note.     PROCEDURE TANGENTIAL (SHAVE) BIOPSY NOTE:    Performing Physician:   Anatomic Location; Clinical Description with size (cm); Pre-Op Diagnosis:   Specimen A: left abdomen; skin; shave biopsy; 38 year old male with a multi colored macule; DDX: atypical mole vs lentigo  Post-op diagnosis: Same     Local anesthesia: 1% xylocaine with epi      Topical anesthesia: None    Hemostasis: Aluminum chloride       After obtaining informed consent  at which time there was a discussion about the purpose of biopsy  and low risks of infection and bleeding.  The area was prepped and draped in the usual fashion. Anesthesia was obtained with 1% lidocaine with " "epinephrine. A shave biopsy to an appropriate sampling depth was obtained by Shave (Dermablade or 15 blade) The resulting wound was covered with surgical ointment and bandaged appropriately.     The patient tolerated the procedure well without complications and was without signs of functional compromise.      Specimen has been sent for review by Dermatopathology.    Standard post-procedure care has been explained and has been included in written form within the patient's copy of Informed Consent.    INFORMED CONSENT DISCUSSION AND POST-OPERATIVE INSTRUCTIONS FOR PATIENT    I.  RATIONALE FOR PROCEDURE  I understand that a skin biopsy allows the Dermatologist to test a lesion or rash under the microscope to obtain a diagnosis.  It usually involves numbing the area with numbing medication and removing a small piece of skin; sometimes the area will be closed with sutures. In this specific procedure, sutures are not usually needed.  If any sutures are placed, then they are usually need to be removed in 2 weeks or less.    I understand that my Dermatologist recommends that a skin \"shave\" biopsy be performed today.  A local anesthetic, similar to the kind that a dentist uses when filling a cavity, will be injected with a very small needle into the skin area to be sampled.  The injected skin and tissue underneath \"will go to sleep” and become numb so no pain should be felt afterwards.  An instrument shaped like a tiny \"razor blade\" (shave biopsy instrument) will be used to cut a small piece of tissue and skin from the area so that a sample of tissue can be taken and examined more closely under the microscope.  A slight amount of bleeding will occur, but it will be stopped with direct pressure and a pressure bandage and any other appropriate methods.  I understands that a scar will form where the wound was created.  Surgical ointment will be applied to help protect the wound.  Sutures are not usually needed.    II.  RISKS " "AND POTENTIAL COMPLICATIONS   I understand the risks and potential complications of a skin biopsy include but are not limited to the following:  Bleeding  Infection  Pain  Scar/keloid  Skin discoloration  Incomplete Removal  Recurrence  Nerve Damage/Numbness/Loss of Function  Allergic Reaction to Anesthesia  Biopsies are diagnostic procedures and based on findings additional treatment or evaluation may be required  Loss or destruction of specimen resulting in no additional findings    My Dermatologist has explained to me the nature of the condition, the nature of the procedure, and the benefits to be reasonably expected compared with alternative approaches.  My Dermatologist has discussed the likelihood of major risks or complications of this procedure including the specific risks listed above, such as bleeding, infection, and scarring/keloid.  I understand that a scar is expected after this procedure.  I understand that my physician cannot predict if the scar will form a \"keloid,\" which extends beyond the borders of the wound that is created.  A keloid is a thick, painful, and bumpy scar.  A keloid can be difficult to treat, as it does not always respond well to therapy, which includes injecting cortisone directly into the keloid every few weeks.  While this usually reduces the pain and size of the scar, it does not eliminate it.      I understand that photographs may be taken before and after the procedure.  These will be maintained as part of the medical providers confidential records and may not be made available to me.  I further authorize the medical provider to use the photographs for teaching purposes or to illustrate scientific papers, books, or lectures if in his/her judgment, medical research, education, or science may benefit from its use.    I have had an opportunity to fully inquire about the risks and benefits of this procedure and its alternatives.   I have been given ample time and opportunity to " "ask questions and to seek a second opinion if I wished to do so.  I acknowledge that there have specifically been no guarantees as to the cosmetic results from the procedure.  I am aware that with any procedure there is always the possibility of an unexpected complication.    III. POST-PROCEDURAL CARE (WHAT YOU WILL NEED TO DO \"AFTER THE BIOPSY\" TO OPTIMIZE HEALING)    Keep the area clean and dry.  Try NOT to remove the bandage or get it wet for the first 24 hours.    Gently clean the area and apply surgical ointment (such as Vaseline petrolatum ointment, which is available \"over the counter\" and not a prescription) to the biopsy site for up to 2 weeks straight.  This acts to protect the wound from the outside world.      Sutures are not usually placed in this procedure.  If any sutures were placed, return for suture removal as instructed (generally 1 week for the face, 2 weeks for the body).      Take Acetaminophen (Tylenol) for discomfort, if no contraindications.  Ibuprofen or aspirin could make bleeding worse.    Call our office immediately for signs of infection: fever, chills, increased redness, warmth, tenderness, discomfort/pain, or pus or foul smell coming from the wound.    WHAT TO DO IF THERE IS ANY BLEEDING?  If a small amount of bleeding is noticed, place a clean cloth over the area and apply firm pressure for ten minutes.  Check the wound after 10 minutes of direct pressure.  If bleeding persists, try one more time for an additional 10 minutes of direct pressure on the area.  If the bleeding becomes heavier or does not stop after the second attempt, or if you have any other questions about this procedure, then please call your [facility]'s Dermatologist by calling [phone] (SKIN).     I hereby acknowledge that I have reviewed and verified the site with my Dermatologist and have requested and authorized my Dermatologist to proceed with the procedure.          PAREDES ANGIOMAS     Physical " "Exam:  Anatomic Location Affected:  Trunk and extremities  Morphological Description:  Scattered cherry red papules  Denies pain, itch, bleeding. No treatments tried. Present for years. Present constantly; no modifying factors which make it worse or better.     Assessment and Plan:  Based on a thorough discussion of this condition and the management approach to it (including a comprehensive discussion of the known risks, side effects and potential benefits of treatment), the patient (family) agrees to implement the following specific plan:  Reassure benign           SOLAR LENTIGINES   OTHER SKIN CHANGES DUE TO CHRONIC EXPOSURE TO NONIONIZING RADIATION     Physical Exam:  Anatomic Location Affected:  Sun exposed areas of back, chest, arms, legs  Morphological Description:  Multiple scattered brown to tan evenly pigmented macules   Denies pain, itch, bleeding. No treatments tried. Present for months - years. Reports getting newer lesions with sun exposure.         Assessment and Plan:  Based on a thorough discussion of this condition and the management approach to it (including a comprehensive discussion of the known risks, side effects and potential benefits of treatment), the patient (family) agrees to implement the following specific plan:  Reassure benign  Use sun protection.  Apply SPF 30 or higher at least three times a day.  Wear sun protecting clothing and hats.         MULTIPLE MELANOCYTIC NEVI (\"Moles\")     Physical Exam:  Anatomic Location Affected: Trunk and extremities  Morphological Description:  Scattered, round to ovoid, symmetrical-appearing, even bordered, skin colored to dark brown macules/papules  Denies pain, itch, bleeding. No treatments tried. Present for years. Present constantly; no modifying factors which make it worse or better. Denies actively changing or growing moles.      Assessment and Plan:  Based on a thorough discussion of this condition and the management approach to it (including a " comprehensive discussion of the known risks, side effects and potential benefits of treatment), the patient (family) agrees to implement the following specific plan:  Reassure benign  Monitor for changes  Use sun protection.  Apply SPF 30 or higher at least three times a day.  Wear sun protecting clothing and hats.       Worrisome signs of skin malignancy discussed, questions answered. Regular self-skin check discussed. Advised to call or return to office if patient notices any spots of concern, rapidly growing/changing lesions, bleeding lesions, non-healing lesions. Advised regular SPF use.

## 2024-02-21 NOTE — PROGRESS NOTES
"[facility]atology [facility] Note     Patient Name: Bebeto Floyd  Encounter Date: 02/21/2024     Have you been cared for by a [facility] Dermatologist in the last 3 years and, if so, which description applies to you?    Yes.  I have been here within the last 3 years, and my medical history has NOT changed since that time.  I am MALE/not capable of bearing children.    REVIEW OF SYSTEMS:  Have you recently had or currently have any of the following? No changes in my recent health.   PAST MEDICAL HISTORY:  Have you personally ever had or currently have any of the following?  If \"YES,\" then please provide more detail. No changes in my medical history.   HISTORY OF IMMUNOSUPPRESSION: Do you have a history of any of the following:  Systemic Immunosuppression such as Diabetes, Biologic or Immunotherapy, Chemotherapy, Organ Transplantation, Bone Marrow Transplantation?  No     Answering \"YES\" requires the addition of the dotphrase \"IMMUNOSUPPRESSED\" as the first diagnosis of the patient's visit.   FAMILY HISTORY:  Any \"first degree relatives\" (parent, brother, sister, or child) with the following?    No changes in my family's known health.   PATIENT EXPERIENCE:    Do you want the Dermatologist to perform a COMPLETE skin exam today including a clinical examination under the \"bra and underwear\" areas?  Yes  If necessary, do we have your permission to call and leave a detailed message on your Preferred Phone number that includes your specific medical information?  Yes      No Known Allergies   Current Outpatient Medications:     celecoxib (CeleBREX) 100 mg capsule, Take 1 PO BID PRN for pain with food. NO OTHER NSAIDS. (Patient not taking: Reported on 2/21/2024), Disp: 45 capsule, Rfl: 1          Whom besides the patient is providing clinical information about today's encounter?   NO ADDITIONAL HISTORIAN (patient alone provided history)    Physical Exam and Assessment/Plan by Diagnosis:    NEOPLASM OF UNCERTAIN BEHAVIOR OF " "SKIN    Physical Exam:  (Anatomic Location); (Size and Morphological Description); (Differential Diagnosis):  Specimen A: left abdomen; skin; shave biopsy; 38 year old male with a multi colored macule; DDX: atypical mole vs lentigo    Additional History of Present Condition:  present on exam    Assessment and Plan:  I have discussed with the patient that a sample of skin via a \"skin biopsy” would be potentially helpful to further make a specific diagnosis under the microscope.  Based on a thorough discussion of this condition and the management approach to it (including a comprehensive discussion of the known risks, side effects and potential benefits of treatment), the patient (family) agrees to implement the following specific plan:    Procedure:  Skin Biopsy.  After a thorough discussion of treatment options and risk/benefits/alternatives (including but not limited to local pain, scarring, dyspigmentation, blistering, possible superinfection, and inability to confirm a diagnosis via histopathology), verbal and written consent were obtained and portion of the rash was biopsied for tissue sample.  See below for consent that was obtained from patient and subsequent Procedure Note.     PROCEDURE TANGENTIAL (SHAVE) BIOPSY NOTE:    Performing Physician:   Anatomic Location; Clinical Description with size (cm); Pre-Op Diagnosis:   Specimen A: left abdomen; skin; shave biopsy; 38 year old male with a multi colored macule; DDX: atypical mole vs lentigo  Post-op diagnosis: Same     Local anesthesia: 1% xylocaine with epi      Topical anesthesia: None    Hemostasis: Aluminum chloride       After obtaining informed consent  at which time there was a discussion about the purpose of biopsy  and low risks of infection and bleeding.  The area was prepped and draped in the usual fashion. Anesthesia was obtained with 1% lidocaine with epinephrine. A shave biopsy to an appropriate sampling depth was obtained by Jacintove " "(Dermablade or 15 blade) The resulting wound was covered with surgical ointment and bandaged appropriately.     The patient tolerated the procedure well without complications and was without signs of functional compromise.      Specimen has been sent for review by Dermatopathology.    Standard post-procedure care has been explained and has been included in written form within the patient's copy of Informed Consent.    INFORMED CONSENT DISCUSSION AND POST-OPERATIVE INSTRUCTIONS FOR PATIENT    I.  RATIONALE FOR PROCEDURE  I understand that a skin biopsy allows the Dermatologist to test a lesion or rash under the microscope to obtain a diagnosis.  It usually involves numbing the area with numbing medication and removing a small piece of skin; sometimes the area will be closed with sutures. In this specific procedure, sutures are not usually needed.  If any sutures are placed, then they are usually need to be removed in 2 weeks or less.    I understand that my Dermatologist recommends that a skin \"shave\" biopsy be performed today.  A local anesthetic, similar to the kind that a dentist uses when filling a cavity, will be injected with a very small needle into the skin area to be sampled.  The injected skin and tissue underneath \"will go to sleep” and become numb so no pain should be felt afterwards.  An instrument shaped like a tiny \"razor blade\" (shave biopsy instrument) will be used to cut a small piece of tissue and skin from the area so that a sample of tissue can be taken and examined more closely under the microscope.  A slight amount of bleeding will occur, but it will be stopped with direct pressure and a pressure bandage and any other appropriate methods.  I understands that a scar will form where the wound was created.  Surgical ointment will be applied to help protect the wound.  Sutures are not usually needed.    II.  RISKS AND POTENTIAL COMPLICATIONS   I understand the risks and potential complications of a " "skin biopsy include but are not limited to the following:  Bleeding  Infection  Pain  Scar/keloid  Skin discoloration  Incomplete Removal  Recurrence  Nerve Damage/Numbness/Loss of Function  Allergic Reaction to Anesthesia  Biopsies are diagnostic procedures and based on findings additional treatment or evaluation may be required  Loss or destruction of specimen resulting in no additional findings    My Dermatologist has explained to me the nature of the condition, the nature of the procedure, and the benefits to be reasonably expected compared with alternative approaches.  My Dermatologist has discussed the likelihood of major risks or complications of this procedure including the specific risks listed above, such as bleeding, infection, and scarring/keloid.  I understand that a scar is expected after this procedure.  I understand that my physician cannot predict if the scar will form a \"keloid,\" which extends beyond the borders of the wound that is created.  A keloid is a thick, painful, and bumpy scar.  A keloid can be difficult to treat, as it does not always respond well to therapy, which includes injecting cortisone directly into the keloid every few weeks.  While this usually reduces the pain and size of the scar, it does not eliminate it.      I understand that photographs may be taken before and after the procedure.  These will be maintained as part of the medical providers confidential records and may not be made available to me.  I further authorize the medical provider to use the photographs for teaching purposes or to illustrate scientific papers, books, or lectures if in his/her judgment, medical research, education, or science may benefit from its use.    I have had an opportunity to fully inquire about the risks and benefits of this procedure and its alternatives.   I have been given ample time and opportunity to ask questions and to seek a second opinion if I wished to do so.  I acknowledge that " "there have specifically been no guarantees as to the cosmetic results from the procedure.  I am aware that with any procedure there is always the possibility of an unexpected complication.    III. POST-PROCEDURAL CARE (WHAT YOU WILL NEED TO DO \"AFTER THE BIOPSY\" TO OPTIMIZE HEALING)    Keep the area clean and dry.  Try NOT to remove the bandage or get it wet for the first 24 hours.    Gently clean the area and apply surgical ointment (such as Vaseline petrolatum ointment, which is available \"over the counter\" and not a prescription) to the biopsy site for up to 2 weeks straight.  This acts to protect the wound from the outside world.      Sutures are not usually placed in this procedure.  If any sutures were placed, return for suture removal as instructed (generally 1 week for the face, 2 weeks for the body).      Take Acetaminophen (Tylenol) for discomfort, if no contraindications.  Ibuprofen or aspirin could make bleeding worse.    Call our office immediately for signs of infection: fever, chills, increased redness, warmth, tenderness, discomfort/pain, or pus or foul smell coming from the wound.    WHAT TO DO IF THERE IS ANY BLEEDING?  If a small amount of bleeding is noticed, place a clean cloth over the area and apply firm pressure for ten minutes.  Check the wound after 10 minutes of direct pressure.  If bleeding persists, try one more time for an additional 10 minutes of direct pressure on the area.  If the bleeding becomes heavier or does not stop after the second attempt, or if you have any other questions about this procedure, then please call your [facility]'s Dermatologist by calling [phone] (SKIN).     I hereby acknowledge that I have reviewed and verified the site with my Dermatologist and have requested and authorized my Dermatologist to proceed with the procedure.          PAREDES ANGIOMAS     Physical Exam:  Anatomic Location Affected:  Trunk and extremities  Morphological Description:  Scattered " "cherry red papules  Denies pain, itch, bleeding. No treatments tried. Present for years. Present constantly; no modifying factors which make it worse or better.     Assessment and Plan:  Based on a thorough discussion of this condition and the management approach to it (including a comprehensive discussion of the known risks, side effects and potential benefits of treatment), the patient (family) agrees to implement the following specific plan:  Reassure benign           SOLAR LENTIGINES   OTHER SKIN CHANGES DUE TO CHRONIC EXPOSURE TO NONIONIZING RADIATION     Physical Exam:  Anatomic Location Affected:  Sun exposed areas of back, chest, arms, legs  Morphological Description:  Multiple scattered brown to tan evenly pigmented macules   Denies pain, itch, bleeding. No treatments tried. Present for months - years. Reports getting newer lesions with sun exposure.         Assessment and Plan:  Based on a thorough discussion of this condition and the management approach to it (including a comprehensive discussion of the known risks, side effects and potential benefits of treatment), the patient (family) agrees to implement the following specific plan:  Reassure benign  Use sun protection.  Apply SPF 30 or higher at least three times a day.  Wear sun protecting clothing and hats.         MULTIPLE MELANOCYTIC NEVI (\"Moles\")     Physical Exam:  Anatomic Location Affected: Trunk and extremities  Morphological Description:  Scattered, round to ovoid, symmetrical-appearing, even bordered, skin colored to dark brown macules/papules  Denies pain, itch, bleeding. No treatments tried. Present for years. Present constantly; no modifying factors which make it worse or better. Denies actively changing or growing moles.      Assessment and Plan:  Based on a thorough discussion of this condition and the management approach to it (including a comprehensive discussion of the known risks, side effects and potential benefits of treatment), " the patient (family) agrees to implement the following specific plan:  Reassure benign  Monitor for changes  Use sun protection.  Apply SPF 30 or higher at least three times a day.  Wear sun protecting clothing and hats.       Worrisome signs of skin malignancy discussed, questions answered. Regular self-skin check discussed. Advised to call or return to office if patient notices any spots of concern, rapidly growing/changing lesions, bleeding lesions, non-healing lesions. Advised regular SPF use.        Scribe Attestation      I,:  Ashlee Hirsch am acting as a scribe while in the presence of the attending physician.:       I,:  Sana Kim MD personally performed the services described in this documentation    as scribed in my presence.:

## 2024-02-27 PROCEDURE — 88342 IMHCHEM/IMCYTCHM 1ST ANTB: CPT | Performed by: STUDENT IN AN ORGANIZED HEALTH CARE EDUCATION/TRAINING PROGRAM

## 2024-02-27 PROCEDURE — 88341 IMHCHEM/IMCYTCHM EA ADD ANTB: CPT | Performed by: STUDENT IN AN ORGANIZED HEALTH CARE EDUCATION/TRAINING PROGRAM

## 2024-02-27 PROCEDURE — 88305 TISSUE EXAM BY PATHOLOGIST: CPT | Performed by: STUDENT IN AN ORGANIZED HEALTH CARE EDUCATION/TRAINING PROGRAM

## 2025-03-26 ENCOUNTER — OFFICE VISIT (OUTPATIENT)
Dept: DERMATOLOGY | Facility: CLINIC | Age: 40
End: 2025-03-26
Payer: COMMERCIAL

## 2025-03-26 VITALS — WEIGHT: 213 LBS | BODY MASS INDEX: 31.45 KG/M2

## 2025-03-26 DIAGNOSIS — L57.8 OTHER SKIN CHANGES DUE TO CHRONIC EXPOSURE TO NONIONIZING RADIATION: ICD-10-CM

## 2025-03-26 DIAGNOSIS — D22.61 MULTIPLE BENIGN MELANOCYTIC NEVI OF UPPER AND LOWER EXTREMITIES AND TRUNK: Primary | ICD-10-CM

## 2025-03-26 DIAGNOSIS — D22.71 MULTIPLE BENIGN MELANOCYTIC NEVI OF UPPER AND LOWER EXTREMITIES AND TRUNK: Primary | ICD-10-CM

## 2025-03-26 DIAGNOSIS — D22.72 MULTIPLE BENIGN MELANOCYTIC NEVI OF UPPER AND LOWER EXTREMITIES AND TRUNK: Primary | ICD-10-CM

## 2025-03-26 DIAGNOSIS — L81.4 LENTIGINES: ICD-10-CM

## 2025-03-26 DIAGNOSIS — D22.5 MULTIPLE BENIGN MELANOCYTIC NEVI OF UPPER AND LOWER EXTREMITIES AND TRUNK: Primary | ICD-10-CM

## 2025-03-26 DIAGNOSIS — D48.5 NEOPLASM OF UNCERTAIN BEHAVIOR OF SKIN: ICD-10-CM

## 2025-03-26 DIAGNOSIS — D22.62 MULTIPLE BENIGN MELANOCYTIC NEVI OF UPPER AND LOWER EXTREMITIES AND TRUNK: Primary | ICD-10-CM

## 2025-03-26 PROCEDURE — 88342 IMHCHEM/IMCYTCHM 1ST ANTB: CPT | Performed by: STUDENT IN AN ORGANIZED HEALTH CARE EDUCATION/TRAINING PROGRAM

## 2025-03-26 PROCEDURE — 88305 TISSUE EXAM BY PATHOLOGIST: CPT | Performed by: STUDENT IN AN ORGANIZED HEALTH CARE EDUCATION/TRAINING PROGRAM

## 2025-03-26 PROCEDURE — 99214 OFFICE O/P EST MOD 30 MIN: CPT | Performed by: DERMATOLOGY

## 2025-03-26 PROCEDURE — 11102 TANGNTL BX SKIN SINGLE LES: CPT | Performed by: DERMATOLOGY

## 2025-03-26 PROCEDURE — 88341 IMHCHEM/IMCYTCHM EA ADD ANTB: CPT | Performed by: STUDENT IN AN ORGANIZED HEALTH CARE EDUCATION/TRAINING PROGRAM

## 2025-03-26 NOTE — PROGRESS NOTES
"[facility] Note     Patient Name: Bebeto Floyd  Encounter Date: 3/26/25     Have you been cared for by a [facility] Dermatologist in the last 3 years and, if so, which description applies to you?    Yes.  I have been here within the last 3 years, and my medical history has NOT changed since that time.  I am MALE/not capable of bearing children.    REVIEW OF SYSTEMS:  Have you recently had or currently have any of the following? No changes in my recent health.   PAST MEDICAL HISTORY:  Have you personally ever had or currently have any of the following?  If \"YES,\" then please provide more detail. No changes in my medical history.   HISTORY OF IMMUNOSUPPRESSION: Do you have a history of any of the following:  Systemic Immunosuppression such as Diabetes, Biologic or Immunotherapy, Chemotherapy, Organ Transplantation, Bone Marrow Transplantation or Prednisone?  No     Answering \"YES\" requires the addition of the dotphrase \"IMMUNOSUPPRESSED\" as the first diagnosis of the patient's visit.   FAMILY HISTORY:  Any \"first degree relatives\" (parent, brother, sister, or child) with the following?    No changes in my family's known health.   PATIENT EXPERIENCE:    Do you want the Dermatologist to perform a COMPLETE skin exam today including a clinical examination under the \"bra and underwear\" areas?  Yes  If necessary, do we have your permission to call and leave a detailed message on your Preferred Phone number that includes your specific medical information?  Yes      No Known Allergies   Current Outpatient Medications:     celecoxib (CeleBREX) 100 mg capsule, Take 1 PO BID PRN for pain with food. NO OTHER NSAIDS. (Patient not taking: Reported on 2/21/2024), Disp: 45 capsule, Rfl: 1          Whom besides the patient is providing clinical information about today's encounter?   NO ADDITIONAL HISTORIAN (patient alone provided history)    Physical Exam and Assessment/Plan by Diagnosis:  NEOPLASM OF UNCERTAIN " "BEHAVIOR OF SKIN    Physical Exam:  (Anatomic Location); (Size and Morphological Description); (Differential Diagnosis):  Specimen A: right mid back; multicolored brown papule; DDX:rule out atypical nevus    Additional History of Present Condition:  present on exam     Assessment and Plan:  I have discussed with the patient that a sample of skin via a \"skin biopsy” would be potentially helpful to further make a specific diagnosis under the microscope.  Based on a thorough discussion of this condition and the management approach to it (including a comprehensive discussion of the known risks, side effects and potential benefits of treatment), the patient (family) agrees to implement the following specific plan:    Procedure:  Skin Biopsy.  After a thorough discussion of treatment options and risk/benefits/alternatives (including but not limited to local pain, scarring, dyspigmentation, blistering, possible superinfection, and inability to confirm a diagnosis via histopathology), verbal and written consent were obtained and portion of the rash was biopsied for tissue sample.  See below for consent that was obtained from patient and subsequent Procedure Note.  PROCEDURE TANGENTIAL (SHAVE) BIOPSY NOTE:    Performing Physician:   Anatomic Location; Clinical Description with size (cm); Pre-Op Diagnosis:   Specimen A: right mid back; multicolored brown papule; DDX: rule out atypical nevus  Post-op diagnosis: Same     Local anesthesia: 1% xylocaine with epi      Topical anesthesia: None    Hemostasis: drysol      After obtaining informed consent  at which time there was a discussion about the purpose of biopsy  and low risks of infection and bleeding.  The area was prepped and draped in the usual fashion. Anesthesia was obtained with 1% lidocaine with epinephrine. A shave biopsy to an appropriate sampling depth was obtained by Shave (Dermablade or 15 blade) The resulting wound was covered with surgical ointment and " "bandaged appropriately.     The patient tolerated the procedure well without complications and was without signs of functional compromise.      Specimen has been sent for review by Dermatopathology.    Standard post-procedure care has been explained and has been included in written form within the patient's copy of Informed Consent.    INFORMED CONSENT DISCUSSION AND POST-OPERATIVE INSTRUCTIONS FOR PATIENT    I.  RATIONALE FOR PROCEDURE  I understand that a skin biopsy allows the Dermatologist to test a lesion or rash under the microscope to obtain a diagnosis.  It usually involves numbing the area with numbing medication and removing a small piece of skin; sometimes the area will be closed with sutures. In this specific procedure, sutures are not usually needed.  If any sutures are placed, then they are usually need to be removed in 2 weeks or less.    I understand that my Dermatologist recommends that a skin \"shave\" biopsy be performed today.  A local anesthetic, similar to the kind that a dentist uses when filling a cavity, will be injected with a very small needle into the skin area to be sampled.  The injected skin and tissue underneath \"will go to sleep” and become numb so no pain should be felt afterwards.  An instrument shaped like a tiny \"razor blade\" (shave biopsy instrument) will be used to cut a small piece of tissue and skin from the area so that a sample of tissue can be taken and examined more closely under the microscope.  A slight amount of bleeding will occur, but it will be stopped with direct pressure and a pressure bandage and any other appropriate methods.  I understands that a scar will form where the wound was created.  Surgical ointment will be applied to help protect the wound.  Sutures are not usually needed.    II.  RISKS AND POTENTIAL COMPLICATIONS   I understand the risks and potential complications of a skin biopsy include but are not limited to the " "following:  Bleeding  Infection  Pain  Scar/keloid  Skin discoloration  Incomplete Removal  Recurrence  Nerve Damage/Numbness/Loss of Function  Allergic Reaction to Anesthesia  Biopsies are diagnostic procedures and based on findings additional treatment or evaluation may be required  Loss or destruction of specimen resulting in no additional findings    My Dermatologist has explained to me the nature of the condition, the nature of the procedure, and the benefits to be reasonably expected compared with alternative approaches.  My Dermatologist has discussed the likelihood of major risks or complications of this procedure including the specific risks listed above, such as bleeding, infection, and scarring/keloid.  I understand that a scar is expected after this procedure.  I understand that my physician cannot predict if the scar will form a \"keloid,\" which extends beyond the borders of the wound that is created.  A keloid is a thick, painful, and bumpy scar.  A keloid can be difficult to treat, as it does not always respond well to therapy, which includes injecting cortisone directly into the keloid every few weeks.  While this usually reduces the pain and size of the scar, it does not eliminate it.      I understand that photographs may be taken before and after the procedure.  These will be maintained as part of the medical providers confidential records and may not be made available to me.  I further authorize the medical provider to use the photographs for teaching purposes or to illustrate scientific papers, books, or lectures if in his/her judgment, medical research, education, or science may benefit from its use.    I have had an opportunity to fully inquire about the risks and benefits of this procedure and its alternatives.   I have been given ample time and opportunity to ask questions and to seek a second opinion if I wished to do so.  I acknowledge that there have specifically been no guarantees as to " "the cosmetic results from the procedure.  I am aware that with any procedure there is always the possibility of an unexpected complication.    III. POST-PROCEDURAL CARE (WHAT YOU WILL NEED TO DO \"AFTER THE BIOPSY\" TO OPTIMIZE HEALING)    Keep the area clean and dry.  Try NOT to remove the bandage or get it wet for the first 24 hours.    Gently clean the area and apply surgical ointment (such as Vaseline petrolatum ointment, which is available \"over the counter\" and not a prescription) to the biopsy site for up to 2 weeks straight.  This acts to protect the wound from the outside world.      Sutures are not usually placed in this procedure.  If any sutures were placed, return for suture removal as instructed (generally 1 week for the face, 2 weeks for the body).      Take Acetaminophen (Tylenol) for discomfort, if no contraindications.  Ibuprofen or aspirin could make bleeding worse.    Call our office immediately for signs of infection: fever, chills, increased redness, warmth, tenderness, discomfort/pain, or pus or foul smell coming from the wound.    WHAT TO DO IF THERE IS ANY BLEEDING?  If a small amount of bleeding is noticed, place a clean cloth over the area and apply firm pressure for ten minutes.  Check the wound after 10 minutes of direct pressure.  If bleeding persists, try one more time for an additional 10 minutes of direct pressure on the area.  If the bleeding becomes heavier or does not stop after the second attempt, or if you have any other questions about this procedure, then please call your [facility]'s Dermatologist by calling [phone] (SKIN).     I hereby acknowledge that I have reviewed and verified the site with my Dermatologist and have requested and authorized my Dermatologist to proceed with the procedure.            SEBORRHEIC KERATOSIS; NON-INFLAMED     Physical Exam:  Anatomic Location Affected:  Trunk and extremities  Morphological Description:  Waxy, smooth to warty textured, yellow " "to brownish-grey to dark brown to blackish, discrete, \"stuck-on\" appearing papules.  Present for years. Denies pain, itch, bleeding.      Additional History of Present Condition:  Present constantly; no modifying factors which make it worse or better. No prior treatment.       Assessment and Plan:  Based on a thorough discussion of this condition and the management approach to it (including a comprehensive discussion of the known risks, side effects and potential benefits of treatment), the patient (family) agrees to implement the following specific plan:  Reassure benign  Use sun protection.  Apply SPF 30 or higher at least three times a day.  Wear sun protecting clothing and hats.        SOLAR LENTIGINES   OTHER SKIN CHANGES DUE TO CHRONIC EXPOSURE TO NONIONIZING RADIATION     Physical Exam:  Anatomic Location Affected:  Sun exposed areas of back, chest, arms, legs  Morphological Description:  Multiple scattered brown to tan evenly pigmented macules   Denies pain, itch, bleeding. No treatments tried. Present for months - years. Reports getting newer lesions with sun exposure.         Assessment and Plan:  Based on a thorough discussion of this condition and the management approach to it (including a comprehensive discussion of the known risks, side effects and potential benefits of treatment), the patient (family) agrees to implement the following specific plan:  Reassure benign  Use sun protection.  Apply SPF 30 or higher at least three times a day.  Wear sun protecting clothing and hats.         MULTIPLE MELANOCYTIC NEVI (\"Moles\")     Physical Exam:  Anatomic Location Affected: Trunk and extremities  Morphological Description:  Scattered, round to ovoid, symmetrical-appearing, even bordered, skin colored to dark brown macules/papules  Denies pain, itch, bleeding. No treatments tried. Present for years. Present constantly; no modifying factors which make it worse or better. Denies actively changing or growing " moles.      Assessment and Plan:  Based on a thorough discussion of this condition and the management approach to it (including a comprehensive discussion of the known risks, side effects and potential benefits of treatment), the patient (family) agrees to implement the following specific plan:  Reassure benign  Monitor for changes  Use sun protection.  Apply SPF 30 or higher at least three times a day.  Wear sun protecting clothing and hats.       Worrisome signs of skin malignancy discussed, questions answered. Regular self-skin check discussed. Advised to call or return to office if patient notices any spots of concern, rapidly growing/changing lesions, bleeding lesions, non-healing lesions. Advised regular SPF use.      Scribe Attestation      I,:  Juana Costello MA am acting as a scribe while in the presence of the attending physician.:       I,:  Sana Kim MD personally performed the services described in this documentation    as scribed in my presence.:

## 2025-04-03 ENCOUNTER — RESULTS FOLLOW-UP (OUTPATIENT)
Dept: DERMATOLOGY | Facility: CLINIC | Age: 40
End: 2025-04-03

## 2025-04-03 PROCEDURE — 88305 TISSUE EXAM BY PATHOLOGIST: CPT | Performed by: STUDENT IN AN ORGANIZED HEALTH CARE EDUCATION/TRAINING PROGRAM

## 2025-04-03 PROCEDURE — 88342 IMHCHEM/IMCYTCHM 1ST ANTB: CPT | Performed by: STUDENT IN AN ORGANIZED HEALTH CARE EDUCATION/TRAINING PROGRAM

## 2025-04-03 PROCEDURE — 88341 IMHCHEM/IMCYTCHM EA ADD ANTB: CPT | Performed by: STUDENT IN AN ORGANIZED HEALTH CARE EDUCATION/TRAINING PROGRAM
